# Patient Record
Sex: FEMALE | NOT HISPANIC OR LATINO | URBAN - METROPOLITAN AREA
[De-identification: names, ages, dates, MRNs, and addresses within clinical notes are randomized per-mention and may not be internally consistent; named-entity substitution may affect disease eponyms.]

---

## 2018-10-09 ENCOUNTER — OUTPATIENT (OUTPATIENT)
Dept: OUTPATIENT SERVICES | Facility: HOSPITAL | Age: 38
LOS: 1 days | End: 2018-10-09
Payer: COMMERCIAL

## 2018-10-09 DIAGNOSIS — N92.0 EXCESSIVE AND FREQUENT MENSTRUATION WITH REGULAR CYCLE: ICD-10-CM

## 2018-10-10 ENCOUNTER — RESULT REVIEW (OUTPATIENT)
Age: 38
End: 2018-10-10

## 2018-10-10 ENCOUNTER — APPOINTMENT (OUTPATIENT)
Dept: GYNECOLOGIC ONCOLOGY | Facility: CLINIC | Age: 38
End: 2018-10-10
Payer: MEDICAID

## 2018-10-10 DIAGNOSIS — E28.2 POLYCYSTIC OVARIAN SYNDROME: ICD-10-CM

## 2018-10-10 DIAGNOSIS — Z80.42 FAMILY HISTORY OF MALIGNANT NEOPLASM OF PROSTATE: ICD-10-CM

## 2018-10-10 PROCEDURE — 88321 CONSLTJ&REPRT SLD PREP ELSWR: CPT

## 2018-10-10 PROCEDURE — 99205 OFFICE O/P NEW HI 60 MIN: CPT

## 2018-10-10 RX ORDER — LOSARTAN POTASSIUM 100 MG/1
TABLET, FILM COATED ORAL
Refills: 0 | Status: ACTIVE | COMMUNITY

## 2018-10-10 RX ORDER — GLYBURIDE 2.5 MG/1
TABLET ORAL
Refills: 0 | Status: ACTIVE | COMMUNITY

## 2018-10-11 LAB — SURGICAL PATHOLOGY STUDY: SIGNIFICANT CHANGE UP

## 2018-10-16 ENCOUNTER — FORM ENCOUNTER (OUTPATIENT)
Age: 38
End: 2018-10-16

## 2018-10-17 ENCOUNTER — OUTPATIENT (OUTPATIENT)
Dept: OUTPATIENT SERVICES | Facility: HOSPITAL | Age: 38
LOS: 1 days | End: 2018-10-17
Payer: MEDICAID

## 2018-10-17 ENCOUNTER — APPOINTMENT (OUTPATIENT)
Dept: ULTRASOUND IMAGING | Facility: CLINIC | Age: 38
End: 2018-10-17
Payer: MEDICAID

## 2018-10-17 ENCOUNTER — APPOINTMENT (OUTPATIENT)
Dept: CT IMAGING | Facility: CLINIC | Age: 38
End: 2018-10-17
Payer: MEDICAID

## 2018-10-17 DIAGNOSIS — C54.1 MALIGNANT NEOPLASM OF ENDOMETRIUM: ICD-10-CM

## 2018-10-17 DIAGNOSIS — D25.9 LEIOMYOMA OF UTERUS, UNSPECIFIED: ICD-10-CM

## 2018-10-17 PROCEDURE — 82565 ASSAY OF CREATININE: CPT

## 2018-10-17 PROCEDURE — 76830 TRANSVAGINAL US NON-OB: CPT

## 2018-10-17 PROCEDURE — 74177 CT ABD & PELVIS W/CONTRAST: CPT | Mod: 26

## 2018-10-17 PROCEDURE — 74177 CT ABD & PELVIS W/CONTRAST: CPT

## 2018-10-17 PROCEDURE — 76830 TRANSVAGINAL US NON-OB: CPT | Mod: 26

## 2018-10-30 ENCOUNTER — OTHER (OUTPATIENT)
Age: 38
End: 2018-10-30

## 2018-12-05 ENCOUNTER — APPOINTMENT (OUTPATIENT)
Dept: SURGICAL ONCOLOGY | Facility: CLINIC | Age: 38
End: 2018-12-05
Payer: MEDICAID

## 2018-12-05 VITALS
HEIGHT: 69 IN | BODY MASS INDEX: 43.4 KG/M2 | RESPIRATION RATE: 15 BRPM | WEIGHT: 293 LBS | OXYGEN SATURATION: 98 % | DIASTOLIC BLOOD PRESSURE: 72 MMHG | HEART RATE: 90 BPM | SYSTOLIC BLOOD PRESSURE: 105 MMHG

## 2018-12-05 DIAGNOSIS — K76.0 FATTY (CHANGE OF) LIVER, NOT ELSEWHERE CLASSIFIED: ICD-10-CM

## 2018-12-05 PROCEDURE — 99205 OFFICE O/P NEW HI 60 MIN: CPT

## 2018-12-26 LAB
CORTIS 24H UR-MCNC: 24 H
CORTIS 24H UR-MRATE: 7 MCG/24 H
METAINT: 24 H
METANEPH 24H UR-MRATE: 74 MCG/24 H
METANEPH UR-MCNC: 800 ML
METANEPHS UR-MCNC: 661 MCG/24 H
NORMETANEPHRINE 24H UR-MCNC: 587 MCG/24 H
SPECIMEN VOL 24H UR: 800 ML
VMA/CREAT UR: 3.7 MG/G CR

## 2018-12-28 LAB
17-KETOGENIC STEROIDS [MASS/VOLUME] IN 24 HOUR URINE: 4.2 MG/24 H
Lab: 1.11 G/24 H
SPECIMEN VOL 24H UR: 800 ML

## 2019-01-30 LAB
DOPAMINE UR-MCNC: 313 UG/L
DOPAMINE, UR, 24HR: 250 UG/24 HR
EPINEPH UR-MCNC: 3 UG/L
EPINEPHRINE, U, 24HR: 2 UG/24 HR
NOREPINEPH UR-MCNC: 71 UG/L
NOREPINEPHRINE,U,24H: 57 UG/24 HR

## 2019-02-07 ENCOUNTER — OTHER (OUTPATIENT)
Age: 39
End: 2019-02-07

## 2019-02-13 ENCOUNTER — OTHER (OUTPATIENT)
Age: 39
End: 2019-02-13

## 2019-02-14 ENCOUNTER — OUTPATIENT (OUTPATIENT)
Dept: OUTPATIENT SERVICES | Facility: HOSPITAL | Age: 39
LOS: 1 days | End: 2019-02-14
Payer: MEDICAID

## 2019-02-14 VITALS
RESPIRATION RATE: 14 BRPM | OXYGEN SATURATION: 99 % | WEIGHT: 293 LBS | DIASTOLIC BLOOD PRESSURE: 84 MMHG | TEMPERATURE: 97 F | HEART RATE: 88 BPM | HEIGHT: 67.25 IN | SYSTOLIC BLOOD PRESSURE: 122 MMHG

## 2019-02-14 DIAGNOSIS — Z98.890 OTHER SPECIFIED POSTPROCEDURAL STATES: Chronic | ICD-10-CM

## 2019-02-14 DIAGNOSIS — C54.1 MALIGNANT NEOPLASM OF ENDOMETRIUM: ICD-10-CM

## 2019-02-14 DIAGNOSIS — E11.9 TYPE 2 DIABETES MELLITUS WITHOUT COMPLICATIONS: ICD-10-CM

## 2019-02-14 LAB
ANION GAP SERPL CALC-SCNC: 9 MMO/L — SIGNIFICANT CHANGE UP (ref 7–14)
BLD GP AB SCN SERPL QL: NEGATIVE — SIGNIFICANT CHANGE UP
BUN SERPL-MCNC: 20 MG/DL — SIGNIFICANT CHANGE UP (ref 7–23)
CALCIUM SERPL-MCNC: 8.8 MG/DL — SIGNIFICANT CHANGE UP (ref 8.4–10.5)
CHLORIDE SERPL-SCNC: 100 MMOL/L — SIGNIFICANT CHANGE UP (ref 98–107)
CO2 SERPL-SCNC: 25 MMOL/L — SIGNIFICANT CHANGE UP (ref 22–31)
CREAT SERPL-MCNC: 0.78 MG/DL — SIGNIFICANT CHANGE UP (ref 0.5–1.3)
GLUCOSE SERPL-MCNC: 255 MG/DL — HIGH (ref 70–99)
HBA1C BLD-MCNC: 7.6 % — HIGH (ref 4–5.6)
HCG SERPL-ACNC: < 5 MIU/ML — SIGNIFICANT CHANGE UP
HCT VFR BLD CALC: 34.9 % — SIGNIFICANT CHANGE UP (ref 34.5–45)
HGB BLD-MCNC: 10 G/DL — LOW (ref 11.5–15.5)
MCHC RBC-ENTMCNC: 20.9 PG — LOW (ref 27–34)
MCHC RBC-ENTMCNC: 28.7 % — LOW (ref 32–36)
MCV RBC AUTO: 73 FL — LOW (ref 80–100)
NRBC # FLD: 0 K/UL — LOW (ref 25–125)
PLATELET # BLD AUTO: 342 K/UL — SIGNIFICANT CHANGE UP (ref 150–400)
PMV BLD: 10.4 FL — SIGNIFICANT CHANGE UP (ref 7–13)
POTASSIUM SERPL-MCNC: 4.3 MMOL/L — SIGNIFICANT CHANGE UP (ref 3.5–5.3)
POTASSIUM SERPL-SCNC: 4.3 MMOL/L — SIGNIFICANT CHANGE UP (ref 3.5–5.3)
RBC # BLD: 4.78 M/UL — SIGNIFICANT CHANGE UP (ref 3.8–5.2)
RBC # FLD: 17.4 % — HIGH (ref 10.3–14.5)
RH IG SCN BLD-IMP: POSITIVE — SIGNIFICANT CHANGE UP
SODIUM SERPL-SCNC: 134 MMOL/L — LOW (ref 135–145)
WBC # BLD: 12.35 K/UL — HIGH (ref 3.8–10.5)
WBC # FLD AUTO: 12.35 K/UL — HIGH (ref 3.8–10.5)

## 2019-02-14 PROCEDURE — 93010 ELECTROCARDIOGRAM REPORT: CPT

## 2019-02-14 RX ORDER — SODIUM CHLORIDE 9 MG/ML
3 INJECTION INTRAMUSCULAR; INTRAVENOUS; SUBCUTANEOUS EVERY 8 HOURS
Qty: 0 | Refills: 0 | Status: DISCONTINUED | OUTPATIENT
Start: 2019-02-28 | End: 2019-03-03

## 2019-02-14 NOTE — H&P PST ADULT - NEGATIVE CARDIOVASCULAR SYMPTOMS
no claudication/no orthopnea/no paroxysmal nocturnal dyspnea/no peripheral edema/no dyspnea on exertion/no palpitations/no chest pain

## 2019-02-14 NOTE — H&P PST ADULT - NEGATIVE GENERAL GENITOURINARY SYMPTOMS
no hematuria/normal urinary frequency/no flank pain L/no flank pain R/no bladder infections/no dysuria

## 2019-02-14 NOTE — H&P PST ADULT - RS GEN PE MLT RESP DETAILS PC
no rhonchi/respirations non-labored/clear to auscultation bilaterally/no intercostal retractions/no wheezes/no chest wall tenderness/no rales/breath sounds equal/good air movement

## 2019-02-14 NOTE — H&P PST ADULT - GASTROINTESTINAL DETAILS
no rebound tenderness/soft/nontender/no masses palpable/bowel sounds normal/no bruit/no rigidity/no organomegaly/obese/no guarding

## 2019-02-14 NOTE — H&P PST ADULT - PMH
Diabetes mellitus  type 2  Hyperlipidemia    Hypertension    Malignant neoplasm of endometrium of corpus uteri    Obese

## 2019-02-14 NOTE — H&P PST ADULT - NSANTHOSAYNRD_GEN_A_CORE
No. BONIFACIO screening performed.  STOP BANG Legend: 0-2 = LOW Risk; 3-4 = INTERMEDIATE Risk; 5-8 = HIGH Risk

## 2019-02-14 NOTE — H&P PST ADULT - PROBLEM SELECTOR PLAN 1
Pt scheduled for exploratory laparotomy, IFRAH, possible BSO, possible sentinel lymph node sampling on 2/28/2019,  labs done results pending, ekg done.  Hibiclens and urine cup provided.  Preop teaching done, pt able to verbalize understanding

## 2019-02-14 NOTE — H&P PST ADULT - NEGATIVE GENERAL SYMPTOMS
no weight loss/no chills/no sweating/no anorexia/no weight gain/no polyphagia/no polyuria/no polydipsia/no fever/no malaise/no fatigue

## 2019-02-14 NOTE — H&P PST ADULT - HISTORY OF PRESENT ILLNESS
39y/o female scheduled for exploratory laparotomy total abdominal hysterectomy, possible bilateral salpingo oophorectomy, possible sentinel lymph node sampling on 2/28/2019.  Pt states, "on 9/2018 informed gyn for 10 months straight, stopped occasional for 1 week.  Underwent endometrial bx positive for malignancy.  11/2018 menstruation changed to once a month."

## 2019-02-14 NOTE — H&P PST ADULT - ATTENDING COMMENTS
Pt seen in [facility] with her mother and sister. Planned procedure discussed, including possible conservation vs removal of the ovaries depending upon the intraop findings. Possible LNS again depending upon the intraop findings. All Q/A. Consent reviewed.

## 2019-02-14 NOTE — H&P PST ADULT - MARITAL STATUS
/no children, mother dm, htn, father  bladder cancer dm, hd, 2 siblings 1 brother dm, sister a&w no children, mother dm, htn, father  bladder cancer dm, hd, 2 siblings 1 brother dm, sister a&w, 1 sister  16y/o pancreatic cancer/

## 2019-02-15 ENCOUNTER — OTHER (OUTPATIENT)
Age: 39
End: 2019-02-15

## 2019-02-19 ENCOUNTER — APPOINTMENT (OUTPATIENT)
Dept: GYNECOLOGIC ONCOLOGY | Facility: CLINIC | Age: 39
End: 2019-02-19
Payer: MEDICAID

## 2019-02-19 VITALS
BODY MASS INDEX: 43.4 KG/M2 | SYSTOLIC BLOOD PRESSURE: 132 MMHG | WEIGHT: 293 LBS | HEIGHT: 69 IN | DIASTOLIC BLOOD PRESSURE: 74 MMHG

## 2019-02-19 DIAGNOSIS — E11.9 TYPE 2 DIABETES MELLITUS W/OUT COMPLICATIONS: ICD-10-CM

## 2019-02-19 PROCEDURE — 99214 OFFICE O/P EST MOD 30 MIN: CPT

## 2019-02-27 ENCOUNTER — TRANSCRIPTION ENCOUNTER (OUTPATIENT)
Age: 39
End: 2019-02-27

## 2019-02-27 NOTE — [facility] PATIENT PROFILE, ADULT - DOES PATIENT HAVE ADVANCE DIRECTIVE
Quality 111:Pneumonia Vaccination Status For Older Adults: Pneumococcal Vaccination Previously Received Detail Level: Detailed Quality 110: Preventive Care And Screening: Influenza Immunization: Influenza Immunization Administered during Influenza season No Yes

## 2019-02-28 ENCOUNTER — RESULT REVIEW (OUTPATIENT)
Age: 39
End: 2019-02-28

## 2019-02-28 ENCOUNTER — APPOINTMENT (OUTPATIENT)
Dept: GYNECOLOGIC ONCOLOGY | Facility: HOSPITAL | Age: 39
End: 2019-02-28

## 2019-02-28 ENCOUNTER — INPATIENT (INPATIENT)
Facility: HOSPITAL | Age: 39
LOS: 2 days | Discharge: HOME | End: 2019-03-03
Attending: OBSTETRICS & GYNECOLOGY | Admitting: OBSTETRICS & GYNECOLOGY
Payer: MEDICAID

## 2019-02-28 VITALS
RESPIRATION RATE: 16 BRPM | WEIGHT: 293 LBS | DIASTOLIC BLOOD PRESSURE: 85 MMHG | SYSTOLIC BLOOD PRESSURE: 128 MMHG | OXYGEN SATURATION: 100 % | TEMPERATURE: 98 F | HEIGHT: 67.25 IN | HEART RATE: 91 BPM

## 2019-02-28 DIAGNOSIS — C54.1 MALIGNANT NEOPLASM OF ENDOMETRIUM: ICD-10-CM

## 2019-02-28 DIAGNOSIS — Z98.890 OTHER SPECIFIED POSTPROCEDURAL STATES: Chronic | ICD-10-CM

## 2019-02-28 LAB
ANION GAP SERPL CALC-SCNC: 13 MMO/L — SIGNIFICANT CHANGE UP (ref 7–14)
BUN SERPL-MCNC: 16 MG/DL — SIGNIFICANT CHANGE UP (ref 7–23)
CALCIUM SERPL-MCNC: 8.4 MG/DL — SIGNIFICANT CHANGE UP (ref 8.4–10.5)
CHLORIDE SERPL-SCNC: 101 MMOL/L — SIGNIFICANT CHANGE UP (ref 98–107)
CO2 SERPL-SCNC: 23 MMOL/L — SIGNIFICANT CHANGE UP (ref 22–31)
CREAT SERPL-MCNC: 0.9 MG/DL — SIGNIFICANT CHANGE UP (ref 0.5–1.3)
GLUCOSE BLDC GLUCOMTR-MCNC: 154 MG/DL — HIGH (ref 70–99)
GLUCOSE BLDC GLUCOMTR-MCNC: 241 MG/DL — HIGH (ref 70–99)
GLUCOSE SERPL-MCNC: 225 MG/DL — HIGH (ref 70–99)
HCG UR QL: NEGATIVE — SIGNIFICANT CHANGE UP
HCT VFR BLD CALC: 36.7 % — SIGNIFICANT CHANGE UP (ref 34.5–45)
HGB BLD-MCNC: 10.7 G/DL — LOW (ref 11.5–15.5)
MAGNESIUM SERPL-MCNC: 1.5 MG/DL — LOW (ref 1.6–2.6)
MCHC RBC-ENTMCNC: 21.3 PG — LOW (ref 27–34)
MCHC RBC-ENTMCNC: 29.2 % — LOW (ref 32–36)
MCV RBC AUTO: 73 FL — LOW (ref 80–100)
NRBC # FLD: 0 K/UL — LOW (ref 25–125)
PHOSPHATE SERPL-MCNC: 4.5 MG/DL — SIGNIFICANT CHANGE UP (ref 2.5–4.5)
PLATELET # BLD AUTO: 293 K/UL — SIGNIFICANT CHANGE UP (ref 150–400)
PMV BLD: 11.2 FL — SIGNIFICANT CHANGE UP (ref 7–13)
POTASSIUM SERPL-MCNC: 4.4 MMOL/L — SIGNIFICANT CHANGE UP (ref 3.5–5.3)
POTASSIUM SERPL-SCNC: 4.4 MMOL/L — SIGNIFICANT CHANGE UP (ref 3.5–5.3)
RBC # BLD: 5.03 M/UL — SIGNIFICANT CHANGE UP (ref 3.8–5.2)
RBC # FLD: 16.6 % — HIGH (ref 10.3–14.5)
RH IG SCN BLD-IMP: POSITIVE — SIGNIFICANT CHANGE UP
SODIUM SERPL-SCNC: 137 MMOL/L — SIGNIFICANT CHANGE UP (ref 135–145)
WBC # BLD: 22.06 K/UL — HIGH (ref 3.8–10.5)
WBC # FLD AUTO: 22.06 K/UL — HIGH (ref 3.8–10.5)

## 2019-02-28 PROCEDURE — 88307 TISSUE EXAM BY PATHOLOGIST: CPT | Mod: 26

## 2019-02-28 PROCEDURE — 88305 TISSUE EXAM BY PATHOLOGIST: CPT | Mod: 26

## 2019-02-28 PROCEDURE — 88112 CYTOPATH CELL ENHANCE TECH: CPT | Mod: 26

## 2019-02-28 PROCEDURE — 88302 TISSUE EXAM BY PATHOLOGIST: CPT | Mod: 26

## 2019-02-28 PROCEDURE — 88331 PATH CONSLTJ SURG 1 BLK 1SPC: CPT | Mod: 26

## 2019-02-28 PROCEDURE — 58150 TOTAL HYSTERECTOMY: CPT | Mod: 22

## 2019-02-28 PROCEDURE — 88360 TUMOR IMMUNOHISTOCHEM/MANUAL: CPT | Mod: 26

## 2019-02-28 PROCEDURE — 88341 IMHCHEM/IMCYTCHM EA ADD ANTB: CPT | Mod: 26,59

## 2019-02-28 PROCEDURE — 88342 IMHCHEM/IMCYTCHM 1ST ANTB: CPT | Mod: 26,59

## 2019-02-28 PROCEDURE — 88305 TISSUE EXAM BY PATHOLOGIST: CPT | Mod: 26,59

## 2019-02-28 RX ORDER — GLYBURIDE 5 MG
1 TABLET ORAL
Qty: 0 | Refills: 0 | COMMUNITY

## 2019-02-28 RX ORDER — DEXTROSE 50 % IN WATER 50 %
12.5 SYRINGE (ML) INTRAVENOUS ONCE
Qty: 0 | Refills: 0 | Status: DISCONTINUED | OUTPATIENT
Start: 2019-02-28 | End: 2019-03-03

## 2019-02-28 RX ORDER — ONDANSETRON 8 MG/1
4 TABLET, FILM COATED ORAL EVERY 6 HOURS
Qty: 0 | Refills: 0 | Status: DISCONTINUED | OUTPATIENT
Start: 2019-02-28 | End: 2019-03-03

## 2019-02-28 RX ORDER — NALOXONE HYDROCHLORIDE 4 MG/.1ML
0.1 SPRAY NASAL
Qty: 0 | Refills: 0 | Status: DISCONTINUED | OUTPATIENT
Start: 2019-02-28 | End: 2019-03-01

## 2019-02-28 RX ORDER — GLUCAGON INJECTION, SOLUTION 0.5 MG/.1ML
1 INJECTION, SOLUTION SUBCUTANEOUS ONCE
Qty: 0 | Refills: 0 | Status: DISCONTINUED | OUTPATIENT
Start: 2019-02-28 | End: 2019-03-03

## 2019-02-28 RX ORDER — HYDROMORPHONE HYDROCHLORIDE 2 MG/ML
0.5 INJECTION INTRAMUSCULAR; INTRAVENOUS; SUBCUTANEOUS
Qty: 0 | Refills: 0 | Status: DISCONTINUED | OUTPATIENT
Start: 2019-02-28 | End: 2019-03-01

## 2019-02-28 RX ORDER — ENOXAPARIN SODIUM 100 MG/ML
70 INJECTION SUBCUTANEOUS DAILY
Qty: 0 | Refills: 0 | Status: COMPLETED | OUTPATIENT
Start: 2020-02-26 | End: 2021-01-24

## 2019-02-28 RX ORDER — METFORMIN HYDROCHLORIDE 850 MG/1
1 TABLET ORAL
Qty: 0 | Refills: 0 | COMMUNITY

## 2019-02-28 RX ORDER — DIPHENHYDRAMINE HCL 50 MG
25 CAPSULE ORAL EVERY 4 HOURS
Qty: 0 | Refills: 0 | Status: DISCONTINUED | OUTPATIENT
Start: 2019-02-28 | End: 2019-03-01

## 2019-02-28 RX ORDER — SODIUM CHLORIDE 9 MG/ML
1000 INJECTION, SOLUTION INTRAVENOUS
Qty: 0 | Refills: 0 | Status: DISCONTINUED | OUTPATIENT
Start: 2019-02-28 | End: 2019-02-28

## 2019-02-28 RX ORDER — ASPIRIN/CALCIUM CARB/MAGNESIUM 324 MG
1 TABLET ORAL
Qty: 0 | Refills: 0 | COMMUNITY

## 2019-02-28 RX ORDER — DEXTROSE 50 % IN WATER 50 %
25 SYRINGE (ML) INTRAVENOUS ONCE
Qty: 0 | Refills: 0 | Status: DISCONTINUED | OUTPATIENT
Start: 2019-02-28 | End: 2019-03-03

## 2019-02-28 RX ORDER — INSULIN LISPRO 100/ML
VIAL (ML) SUBCUTANEOUS
Qty: 0 | Refills: 0 | Status: DISCONTINUED | OUTPATIENT
Start: 2019-02-28 | End: 2019-03-03

## 2019-02-28 RX ORDER — HEPARIN SODIUM 5000 [USP'U]/ML
5000 INJECTION INTRAVENOUS; SUBCUTANEOUS ONCE
Qty: 0 | Refills: 0 | Status: COMPLETED | OUTPATIENT
Start: 2019-02-28 | End: 2019-02-28

## 2019-02-28 RX ORDER — SIMETHICONE 80 MG/1
80 TABLET, CHEWABLE ORAL EVERY 6 HOURS
Qty: 0 | Refills: 0 | Status: DISCONTINUED | OUTPATIENT
Start: 2019-02-28 | End: 2019-03-03

## 2019-02-28 RX ORDER — DEXTROSE 50 % IN WATER 50 %
15 SYRINGE (ML) INTRAVENOUS ONCE
Qty: 0 | Refills: 0 | Status: DISCONTINUED | OUTPATIENT
Start: 2019-02-28 | End: 2019-03-03

## 2019-02-28 RX ORDER — DOCUSATE SODIUM 100 MG
100 CAPSULE ORAL
Qty: 0 | Refills: 0 | Status: DISCONTINUED | OUTPATIENT
Start: 2019-02-28 | End: 2019-03-03

## 2019-02-28 RX ORDER — ONDANSETRON 8 MG/1
4 TABLET, FILM COATED ORAL ONCE
Qty: 0 | Refills: 0 | Status: DISCONTINUED | OUTPATIENT
Start: 2019-02-28 | End: 2019-03-03

## 2019-02-28 RX ORDER — SODIUM CHLORIDE 9 MG/ML
1000 INJECTION, SOLUTION INTRAVENOUS
Qty: 0 | Refills: 0 | Status: DISCONTINUED | OUTPATIENT
Start: 2019-02-28 | End: 2019-03-03

## 2019-02-28 RX ORDER — SODIUM CHLORIDE 9 MG/ML
1000 INJECTION, SOLUTION INTRAVENOUS
Qty: 0 | Refills: 0 | Status: DISCONTINUED | OUTPATIENT
Start: 2019-02-28 | End: 2019-03-01

## 2019-02-28 RX ORDER — LOSARTAN POTASSIUM 100 MG/1
1 TABLET, FILM COATED ORAL
Qty: 0 | Refills: 0 | COMMUNITY

## 2019-02-28 RX ORDER — HYDROMORPHONE HYDROCHLORIDE 2 MG/ML
30 INJECTION INTRAMUSCULAR; INTRAVENOUS; SUBCUTANEOUS
Qty: 0 | Refills: 0 | Status: DISCONTINUED | OUTPATIENT
Start: 2019-02-28 | End: 2019-03-01

## 2019-02-28 RX ORDER — SIMVASTATIN 20 MG/1
1 TABLET, FILM COATED ORAL
Qty: 0 | Refills: 0 | COMMUNITY

## 2019-02-28 RX ADMIN — Medication 2: at 23:19

## 2019-02-28 RX ADMIN — HYDROMORPHONE HYDROCHLORIDE 0.5 MILLIGRAM(S): 2 INJECTION INTRAMUSCULAR; INTRAVENOUS; SUBCUTANEOUS at 21:05

## 2019-02-28 RX ADMIN — SODIUM CHLORIDE 185 MILLILITER(S): 9 INJECTION, SOLUTION INTRAVENOUS at 20:08

## 2019-02-28 RX ADMIN — SODIUM CHLORIDE 3 MILLILITER(S): 9 INJECTION INTRAMUSCULAR; INTRAVENOUS; SUBCUTANEOUS at 21:51

## 2019-02-28 RX ADMIN — HYDROMORPHONE HYDROCHLORIDE 0.5 MILLIGRAM(S): 2 INJECTION INTRAMUSCULAR; INTRAVENOUS; SUBCUTANEOUS at 21:45

## 2019-02-28 RX ADMIN — HYDROMORPHONE HYDROCHLORIDE 30 MILLILITER(S): 2 INJECTION INTRAMUSCULAR; INTRAVENOUS; SUBCUTANEOUS at 20:04

## 2019-02-28 RX ADMIN — HYDROMORPHONE HYDROCHLORIDE 0.5 MILLIGRAM(S): 2 INJECTION INTRAMUSCULAR; INTRAVENOUS; SUBCUTANEOUS at 21:30

## 2019-02-28 RX ADMIN — HEPARIN SODIUM 5000 UNIT(S): 5000 INJECTION INTRAVENOUS; SUBCUTANEOUS at 22:31

## 2019-02-28 RX ADMIN — HYDROMORPHONE HYDROCHLORIDE 0.5 MILLIGRAM(S): 2 INJECTION INTRAMUSCULAR; INTRAVENOUS; SUBCUTANEOUS at 20:50

## 2019-02-28 RX ADMIN — HYDROMORPHONE HYDROCHLORIDE 30 MILLILITER(S): 2 INJECTION INTRAMUSCULAR; INTRAVENOUS; SUBCUTANEOUS at 21:38

## 2019-03-01 ENCOUNTER — OUTPATIENT (OUTPATIENT)
Dept: OUTPATIENT SERVICES | Facility: HOSPITAL | Age: 39
LOS: 1 days | End: 2019-03-01
Payer: MEDICAID

## 2019-03-01 DIAGNOSIS — Z98.890 OTHER SPECIFIED POSTPROCEDURAL STATES: Chronic | ICD-10-CM

## 2019-03-01 DIAGNOSIS — Z98.890 OTHER SPECIFIED POSTPROCEDURAL STATES: ICD-10-CM

## 2019-03-01 LAB
ANION GAP SERPL CALC-SCNC: 11 MMO/L — SIGNIFICANT CHANGE UP (ref 7–14)
BUN SERPL-MCNC: 16 MG/DL — SIGNIFICANT CHANGE UP (ref 7–23)
CALCIUM SERPL-MCNC: 8.5 MG/DL — SIGNIFICANT CHANGE UP (ref 8.4–10.5)
CHLORIDE SERPL-SCNC: 100 MMOL/L — SIGNIFICANT CHANGE UP (ref 98–107)
CO2 SERPL-SCNC: 23 MMOL/L — SIGNIFICANT CHANGE UP (ref 22–31)
CREAT SERPL-MCNC: 0.92 MG/DL — SIGNIFICANT CHANGE UP (ref 0.5–1.3)
GLUCOSE BLDC GLUCOMTR-MCNC: 156 MG/DL — HIGH (ref 70–99)
GLUCOSE BLDC GLUCOMTR-MCNC: 164 MG/DL — HIGH (ref 70–99)
GLUCOSE BLDC GLUCOMTR-MCNC: 187 MG/DL — HIGH (ref 70–99)
GLUCOSE BLDC GLUCOMTR-MCNC: 212 MG/DL — HIGH (ref 70–99)
GLUCOSE SERPL-MCNC: 199 MG/DL — HIGH (ref 70–99)
HCT VFR BLD CALC: 35.3 % — SIGNIFICANT CHANGE UP (ref 34.5–45)
HGB BLD-MCNC: 9.9 G/DL — LOW (ref 11.5–15.5)
MAGNESIUM SERPL-MCNC: 1.6 MG/DL — SIGNIFICANT CHANGE UP (ref 1.6–2.6)
MCHC RBC-ENTMCNC: 21.1 PG — LOW (ref 27–34)
MCHC RBC-ENTMCNC: 28 % — LOW (ref 32–36)
MCV RBC AUTO: 75.1 FL — LOW (ref 80–100)
NRBC # FLD: 0 K/UL — LOW (ref 25–125)
PHOSPHATE SERPL-MCNC: 4.3 MG/DL — SIGNIFICANT CHANGE UP (ref 2.5–4.5)
PLATELET # BLD AUTO: 311 K/UL — SIGNIFICANT CHANGE UP (ref 150–400)
PMV BLD: 11 FL — SIGNIFICANT CHANGE UP (ref 7–13)
POTASSIUM SERPL-MCNC: 5.1 MMOL/L — SIGNIFICANT CHANGE UP (ref 3.5–5.3)
POTASSIUM SERPL-SCNC: 5.1 MMOL/L — SIGNIFICANT CHANGE UP (ref 3.5–5.3)
RBC # BLD: 4.7 M/UL — SIGNIFICANT CHANGE UP (ref 3.8–5.2)
RBC # FLD: 15.9 % — HIGH (ref 10.3–14.5)
SODIUM SERPL-SCNC: 134 MMOL/L — LOW (ref 135–145)
WBC # BLD: 17.51 K/UL — HIGH (ref 3.8–10.5)
WBC # FLD AUTO: 17.51 K/UL — HIGH (ref 3.8–10.5)

## 2019-03-01 PROCEDURE — G9001: CPT

## 2019-03-01 RX ORDER — ENOXAPARIN SODIUM 100 MG/ML
40 INJECTION SUBCUTANEOUS DAILY
Qty: 0 | Refills: 0 | Status: DISCONTINUED | OUTPATIENT
Start: 2019-03-01 | End: 2019-03-03

## 2019-03-01 RX ORDER — OXYCODONE HYDROCHLORIDE 5 MG/1
10 TABLET ORAL
Qty: 0 | Refills: 0 | Status: DISCONTINUED | OUTPATIENT
Start: 2019-03-01 | End: 2019-03-03

## 2019-03-01 RX ORDER — ACETAMINOPHEN 500 MG
975 TABLET ORAL EVERY 6 HOURS
Qty: 0 | Refills: 0 | Status: DISCONTINUED | OUTPATIENT
Start: 2019-03-01 | End: 2019-03-03

## 2019-03-01 RX ORDER — ACETAMINOPHEN 500 MG
1000 TABLET ORAL ONCE
Qty: 0 | Refills: 0 | Status: DISCONTINUED | OUTPATIENT
Start: 2019-03-01 | End: 2019-03-01

## 2019-03-01 RX ORDER — METOPROLOL TARTRATE 50 MG
5 TABLET ORAL EVERY 6 HOURS
Qty: 0 | Refills: 0 | Status: DISCONTINUED | OUTPATIENT
Start: 2019-03-01 | End: 2019-03-03

## 2019-03-01 RX ORDER — OXYCODONE HYDROCHLORIDE 5 MG/1
5 TABLET ORAL
Qty: 0 | Refills: 0 | Status: DISCONTINUED | OUTPATIENT
Start: 2019-03-01 | End: 2019-03-03

## 2019-03-01 RX ORDER — IBUPROFEN 200 MG
600 TABLET ORAL EVERY 6 HOURS
Qty: 0 | Refills: 0 | Status: DISCONTINUED | OUTPATIENT
Start: 2019-03-01 | End: 2019-03-03

## 2019-03-01 RX ADMIN — OXYCODONE HYDROCHLORIDE 10 MILLIGRAM(S): 5 TABLET ORAL at 22:48

## 2019-03-01 RX ADMIN — SODIUM CHLORIDE 3 MILLILITER(S): 9 INJECTION INTRAMUSCULAR; INTRAVENOUS; SUBCUTANEOUS at 05:46

## 2019-03-01 RX ADMIN — Medication 100 MILLIGRAM(S): at 17:32

## 2019-03-01 RX ADMIN — Medication 100 MILLIGRAM(S): at 05:45

## 2019-03-01 RX ADMIN — Medication 600 MILLIGRAM(S): at 17:30

## 2019-03-01 RX ADMIN — Medication 1: at 08:50

## 2019-03-01 RX ADMIN — SIMETHICONE 80 MILLIGRAM(S): 80 TABLET, CHEWABLE ORAL at 01:01

## 2019-03-01 RX ADMIN — HYDROMORPHONE HYDROCHLORIDE 30 MILLILITER(S): 2 INJECTION INTRAMUSCULAR; INTRAVENOUS; SUBCUTANEOUS at 00:34

## 2019-03-01 RX ADMIN — SIMETHICONE 80 MILLIGRAM(S): 80 TABLET, CHEWABLE ORAL at 17:31

## 2019-03-01 RX ADMIN — Medication 1: at 12:05

## 2019-03-01 RX ADMIN — Medication 975 MILLIGRAM(S): at 17:30

## 2019-03-01 RX ADMIN — SODIUM CHLORIDE 3 MILLILITER(S): 9 INJECTION INTRAMUSCULAR; INTRAVENOUS; SUBCUTANEOUS at 13:50

## 2019-03-01 RX ADMIN — OXYCODONE HYDROCHLORIDE 10 MILLIGRAM(S): 5 TABLET ORAL at 23:12

## 2019-03-01 RX ADMIN — Medication 1: at 17:31

## 2019-03-01 RX ADMIN — SODIUM CHLORIDE 3 MILLILITER(S): 9 INJECTION INTRAMUSCULAR; INTRAVENOUS; SUBCUTANEOUS at 21:22

## 2019-03-01 RX ADMIN — SIMETHICONE 80 MILLIGRAM(S): 80 TABLET, CHEWABLE ORAL at 05:45

## 2019-03-01 RX ADMIN — ENOXAPARIN SODIUM 40 MILLIGRAM(S): 100 INJECTION SUBCUTANEOUS at 12:04

## 2019-03-01 RX ADMIN — HYDROMORPHONE HYDROCHLORIDE 30 MILLILITER(S): 2 INJECTION INTRAMUSCULAR; INTRAVENOUS; SUBCUTANEOUS at 07:19

## 2019-03-01 RX ADMIN — SIMETHICONE 80 MILLIGRAM(S): 80 TABLET, CHEWABLE ORAL at 12:04

## 2019-03-01 NOTE — PROGRESS NOTE ADULT - SUBJECTIVE AND OBJECTIVE BOX
Anesthesia Pain Management Service    SUBJECTIVE: Patient is doing well with IV PCA and no significant problems reported.    Pain Scale Score	At rest: __5_ 	With Activity: ___ 	[X ] Refer to charted pain scores    THERAPY:    [ ] IV PCA Morphine		[ ] 5 mg/mL	[ ] 1 mg/mL  [X ] IV PCA Hydromorphone	[ ] 5 mg/mL	[X ] 1 mg/mL  [ ] IV PCA Fentanyl		[ ] 50 micrograms/mL    Demand dose __0.2_ lockout __6_ (minutes) Continuous Rate _0__ Total: _3.8__  mg used (in past 24 hours)      MEDICATIONS  (STANDING):  acetaminophen  IVPB .. 1000 milliGRAM(s) IV Intermittent once  dextrose 5%. 1000 milliLiter(s) (50 mL/Hr) IV Continuous <Continuous>  dextrose 50% Injectable 12.5 Gram(s) IV Push once  dextrose 50% Injectable 25 Gram(s) IV Push once  dextrose 50% Injectable 25 Gram(s) IV Push once  docusate sodium 100 milliGRAM(s) Oral two times a day  HYDROmorphone PCA (1 mG/mL) 30 milliLiter(s) PCA Continuous PCA Continuous  insulin lispro (HumaLOG) corrective regimen sliding scale   SubCutaneous three times a day before meals  lactated ringers. 1000 milliLiter(s) (185 mL/Hr) IV Continuous <Continuous>  simethicone 80 milliGRAM(s) Chew every 6 hours  sodium chloride 0.9% lock flush 3 milliLiter(s) IV Push every 8 hours    MEDICATIONS  (PRN):  dextrose 40% Gel 15 Gram(s) Oral once PRN Blood Glucose LESS THAN 70 milliGRAM(s)/deciliter  diphenhydrAMINE   Injectable 25 milliGRAM(s) IV Push every 4 hours PRN Pruritus  glucagon  Injectable 1 milliGRAM(s) IntraMuscular once PRN Glucose LESS THAN 70 milligrams/deciliter  HYDROmorphone  Injectable 0.5 milliGRAM(s) IV Push every 10 minutes PRN Severe Pain (7 - 10)  HYDROmorphone PCA (1 mG/mL) Rescue Clinician Bolus 0.5 milliGRAM(s) IV Push every 15 minutes PRN for Pain Scale GREATER THAN 6  naloxone Injectable 0.1 milliGRAM(s) IV Push every 3 minutes PRN For ANY of the following changes in patient status:  A. RR LESS THAN 10 breaths per minute, B. Oxygen saturation LESS THAN 90%, C. Sedation score of 6  ondansetron Injectable 4 milliGRAM(s) IV Push every 6 hours PRN Nausea  ondansetron Injectable 4 milliGRAM(s) IV Push once PRN Nausea and/or Vomiting      OBJECTIVE:    Sedation Score:	[ X] Alert	[ ] Drowsy 	[ ] Arousable	[ ] Asleep	[ ] Unresponsive    Side Effects:	[X ] None	[ ] Nausea	[ ] Vomiting	[ ] Pruritus  		[ ] Other:    Vital Signs Last 24 Hrs  T(C): 36.5 (01 Mar 2019 05:42), Max: 37.1 (28 Feb 2019 19:35)  T(F): 97.7 (01 Mar 2019 05:42), Max: 98.8 (28 Feb 2019 19:35)  HR: 84 (01 Mar 2019 05:42) (79 - 97)  BP: 119/74 (01 Mar 2019 05:42) (108/72 - 143/90)  BP(mean): 72 (28 Feb 2019 23:00) (72 - 100)  RR: 18 (01 Mar 2019 05:42) (13 - 20)  SpO2: 97% (01 Mar 2019 05:42) (84% - 100%)    ASSESSMENT/ PLAN    Therapy to  be:	[ X] Continue   [ ] Discontinued   [ ] Change to prn Analgesics    Documentation and Verification of current medications:   [X] Done	[ ] Not done, not elligible    Comments: Patient cleared for PO intake but hasn't had breakfast yet. Can d/c IV PCA if patient tolerating PO intake. Will add IV tylenol.     Progress Note written now but Patient was seen earlier.

## 2019-03-01 NOTE — PROGRESS NOTE ADULT - ASSESSMENT
39 y/o s/p ex-lap, IFRAH, BSO, MUS, USS, posterior repair (frozen=benign). 39 y/o s/p ex-lap, IFRAH, BSO, MUS, USS, posterior repair (frozen=benign). Doing well this AM.

## 2019-03-01 NOTE — PROGRESS NOTE ADULT - SUBJECTIVE AND OBJECTIVE BOX
Subjective  Patient evaluated at bedside. Overnight patient with oxygen desaturation to 85% on room air, started on )2 by nasal cannula with improvement.  Currently denies chest pain, shortness of breath.  Denies history of BONIFACIO.  Patient out of bed tolerating clears.  Pain well controlled.  No flatus.  Patient denies chest pain, shortness of breath, fevers, chills, nausea, vomiting, diarrhea.        dextrose 40% Gel 15 Gram(s) Oral once PRN  dextrose 5%. 1000 milliLiter(s) IV Continuous <Continuous>  dextrose 50% Injectable 12.5 Gram(s) IV Push once  dextrose 50% Injectable 25 Gram(s) IV Push once  dextrose 50% Injectable 25 Gram(s) IV Push once  diphenhydrAMINE   Injectable 25 milliGRAM(s) IV Push every 4 hours PRN  docusate sodium 100 milliGRAM(s) Oral two times a day  glucagon  Injectable 1 milliGRAM(s) IntraMuscular once PRN  HYDROmorphone  Injectable 0.5 milliGRAM(s) IV Push every 10 minutes PRN  HYDROmorphone PCA (1 mG/mL) 30 milliLiter(s) PCA Continuous PCA Continuous  HYDROmorphone PCA (1 mG/mL) Rescue Clinician Bolus 0.5 milliGRAM(s) IV Push every 15 minutes PRN  insulin lispro (HumaLOG) corrective regimen sliding scale   SubCutaneous three times a day before meals  lactated ringers. 1000 milliLiter(s) IV Continuous <Continuous>  naloxone Injectable 0.1 milliGRAM(s) IV Push every 3 minutes PRN  ondansetron Injectable 4 milliGRAM(s) IV Push every 6 hours PRN  ondansetron Injectable 4 milliGRAM(s) IV Push once PRN  simethicone 80 milliGRAM(s) Chew every 6 hours  sodium chloride 0.9% lock flush 3 milliLiter(s) IV Push every 8 hours      Objective  Physical exam  VS: T(C): 36.5 (03-01-19 @ 05:42), Max: 36.8 (02-28-19 @ 22:45)  HR: 84 (03-01-19 @ 05:42) (79 - 97)  BP: 119/74 (03-01-19 @ 05:42) (108/72 - 143/90)  RR: 18 (03-01-19 @ 05:42) (13 - 20)  SpO2: 97% (03-01-19 @ 05:42) (84% - 98%)  Gen: No acute distress, alert and oriented  CV: Regular rate and rhythm  Pulm: Clear to ascultation bilaterally  Abd:  corpulent.  Midline verticle incision with provena in place.  Nontender nondistended  Ext: nontender bilaterally, SCDs in place      02-28 @ 07:01  -  03-01 @ 07:00  --------------------------------------------------------  IN: 1790 mL / OUT: 620 mL / NET: 1170 mL                A/P  38y F POD#   s/p    Neuro: Pain controlled with  CV: Hemodynamically stable  Pulm: Oxygenating well on room air, continue IS  GI: Tolerating  : Adequate UOP, continue to monitor I's/O's  ID: afebrile, WBC stable  Heme: DVT prophylaxis with   , early ambulation and SCDs      Gretta Pierre PGY4  [id number] Subjective  Patient evaluated at bedside. Overnight patient with oxygen desaturation to 85% on room air, started on )2 by nasal cannula with improvement.  Currently denies chest pain, shortness of breath.  Denies history of BONIFACIO.  Patient out of bed tolerating clears.  Pain well controlled.  No flatus.  Patient denies chest pain, shortness of breath, fevers, chills, nausea, vomiting, diarrhea.        dextrose 40% Gel 15 Gram(s) Oral once PRN  dextrose 5%. 1000 milliLiter(s) IV Continuous <Continuous>  dextrose 50% Injectable 12.5 Gram(s) IV Push once  dextrose 50% Injectable 25 Gram(s) IV Push once  dextrose 50% Injectable 25 Gram(s) IV Push once  diphenhydrAMINE   Injectable 25 milliGRAM(s) IV Push every 4 hours PRN  docusate sodium 100 milliGRAM(s) Oral two times a day  glucagon  Injectable 1 milliGRAM(s) IntraMuscular once PRN  HYDROmorphone  Injectable 0.5 milliGRAM(s) IV Push every 10 minutes PRN  HYDROmorphone PCA (1 mG/mL) 30 milliLiter(s) PCA Continuous PCA Continuous  HYDROmorphone PCA (1 mG/mL) Rescue Clinician Bolus 0.5 milliGRAM(s) IV Push every 15 minutes PRN  insulin lispro (HumaLOG) corrective regimen sliding scale   SubCutaneous three times a day before meals  lactated ringers. 1000 milliLiter(s) IV Continuous <Continuous>  naloxone Injectable 0.1 milliGRAM(s) IV Push every 3 minutes PRN  ondansetron Injectable 4 milliGRAM(s) IV Push every 6 hours PRN  ondansetron Injectable 4 milliGRAM(s) IV Push once PRN  simethicone 80 milliGRAM(s) Chew every 6 hours  sodium chloride 0.9% lock flush 3 milliLiter(s) IV Push every 8 hours      Objective  Physical exam  VS: T(C): 36.5 (03-01-19 @ 05:42), Max: 36.8 (02-28-19 @ 22:45)  HR: 84 (03-01-19 @ 05:42) (79 - 97)  BP: 119/74 (03-01-19 @ 05:42) (108/72 - 143/90)  RR: 18 (03-01-19 @ 05:42) (13 - 20)  SpO2: 97% (03-01-19 @ 05:42) (84% - 98%)  Gen: No acute distress, alert and oriented  CV: Regular rate and rhythm  Pulm: Clear to ascultation bilaterally  Abd:  corpulent.  Midline verticle incision with provena in place.  Nontender nondistended  Ext: nontender bilaterally, SCDs in place      02-28 @ 07:01  -  03-01 @ 07:00  --------------------------------------------------------  IN: 1790 mL / OUT: 620 mL / NET: 1170 mL

## 2019-03-01 NOTE — PROGRESS NOTE ADULT - SUBJECTIVE AND OBJECTIVE BOX
ANESTHESIA POSTOP CHECK    38y Female POSTOP DAY 1 S/P IFRAH/BSO    Vital Signs Last 24 Hrs  T(C): 36.5 (01 Mar 2019 05:42), Max: 37.1 (28 Feb 2019 19:35)  T(F): 97.7 (01 Mar 2019 05:42), Max: 98.8 (28 Feb 2019 19:35)  HR: 84 (01 Mar 2019 05:42) (79 - 97)  BP: 119/74 (01 Mar 2019 05:42) (108/72 - 143/90)  BP(mean): 72 (28 Feb 2019 23:00) (72 - 100)  RR: 18 (01 Mar 2019 05:42) (13 - 20)  SpO2: 97% (01 Mar 2019 05:42) (84% - 100%)  I&O's Summary    28 Feb 2019 07:01  -  01 Mar 2019 07:00  --------------------------------------------------------  IN: 1790 mL / OUT: 620 mL / NET: 1170 mL        [X ] NO APPARENT ANESTHESIA COMPLICATIONS      Comments: none

## 2019-03-01 NOTE — PROGRESS NOTE ADULT - PROBLEM SELECTOR PLAN 1
CV: hemodynamically stable at this time, continue to monitor VS, appropriate H/H drop overnight  Pulm: sat well on RA, encourage IS, on O2 overnight for desat, continue to monitor oxygenation this AM, wean from NC  GI: advance to reg diabetic diet  : Adequate UOP overnight, lobato d/c'd this AM, DTV@330p  Heme: HSQ/venodynes overnight, start Lovenox this AM; encourage ambulation  Endo: ISS, monitor glucose levels  Neuro: PCA for pain control    Farhana, pgy3

## 2019-03-01 NOTE — CHART NOTE - NSCHARTNOTEFT_GEN_A_CORE
R3 Update    Pt seen and evaluated at bedside. Pt ambulating, voiding, passing flatus, tolerating PO. She denies SOB, CP, n/v, fever/chills. No other complaints at this time.    Vital Signs Last 24 Hrs  T(C): 36.8 (01 Mar 2019 16:42), Max: 37.1 (28 Feb 2019 19:35)  T(F): 98.2 (01 Mar 2019 16:42), Max: 98.8 (28 Feb 2019 19:35)  HR: 83 (01 Mar 2019 16:42) (70 - 97)  BP: 108/74 (01 Mar 2019 16:42) (106/48 - 143/90)  BP(mean): 72 (28 Feb 2019 23:00) (72 - 100)  RR: 19 (01 Mar 2019 16:42) (13 - 20)  SpO2: 97% (01 Mar 2019 16:42) (84% - 100%)    Gen: NAD, AAOx3  Abd: soft, NT, ND  Ext: NTB/L    Plan:  -continue routine post-op care  -transitioned to PO pain meds today  -c/w lovenox for DVT ppx  -c/w ISS for glucose control  -transition to losartan this weekend    Farhana, pgy3

## 2019-03-01 NOTE — CHART NOTE - NSCHARTNOTEFT_GEN_A_CORE
Seen in f/u. Labs and flow reviewed. Surgery and findings discussed. Instructions disc, including wound care/ Coverage discussed; case d/w LDS as well. All Q/A. Seen in f/u. Labs and flow reviewed. Surgery and findings discussed. She reports ping PO, voiding, and ambulating. Instructions disc, including wound care/ Coverage discussed; case d/w LDS as well. All Q/A.

## 2019-03-02 LAB
ANION GAP SERPL CALC-SCNC: 12 MMO/L — SIGNIFICANT CHANGE UP (ref 7–14)
BASOPHILS # BLD AUTO: 0.03 K/UL — SIGNIFICANT CHANGE UP (ref 0–0.2)
BASOPHILS NFR BLD AUTO: 0.3 % — SIGNIFICANT CHANGE UP (ref 0–2)
BUN SERPL-MCNC: 16 MG/DL — SIGNIFICANT CHANGE UP (ref 7–23)
CALCIUM SERPL-MCNC: 8.5 MG/DL — SIGNIFICANT CHANGE UP (ref 8.4–10.5)
CHLORIDE SERPL-SCNC: 103 MMOL/L — SIGNIFICANT CHANGE UP (ref 98–107)
CO2 SERPL-SCNC: 23 MMOL/L — SIGNIFICANT CHANGE UP (ref 22–31)
CREAT SERPL-MCNC: 1.05 MG/DL — SIGNIFICANT CHANGE UP (ref 0.5–1.3)
EOSINOPHIL # BLD AUTO: 0.06 K/UL — SIGNIFICANT CHANGE UP (ref 0–0.5)
EOSINOPHIL NFR BLD AUTO: 0.6 % — SIGNIFICANT CHANGE UP (ref 0–6)
GLUCOSE BLDC GLUCOMTR-MCNC: 139 MG/DL — HIGH (ref 70–99)
GLUCOSE BLDC GLUCOMTR-MCNC: 190 MG/DL — HIGH (ref 70–99)
GLUCOSE BLDC GLUCOMTR-MCNC: 195 MG/DL — HIGH (ref 70–99)
GLUCOSE BLDC GLUCOMTR-MCNC: 209 MG/DL — HIGH (ref 70–99)
GLUCOSE SERPL-MCNC: 137 MG/DL — HIGH (ref 70–99)
HCT VFR BLD CALC: 33.8 % — LOW (ref 34.5–45)
HCT VFR BLD CALC: 34 % — LOW (ref 34.5–45)
HGB BLD-MCNC: 8.8 G/DL — LOW (ref 11.5–15.5)
HGB BLD-MCNC: 9.8 G/DL — LOW (ref 11.5–15.5)
IMM GRANULOCYTES NFR BLD AUTO: 0.3 % — SIGNIFICANT CHANGE UP (ref 0–1.5)
LYMPHOCYTES # BLD AUTO: 3.48 K/UL — HIGH (ref 1–3.3)
LYMPHOCYTES # BLD AUTO: 32.4 % — SIGNIFICANT CHANGE UP (ref 13–44)
MAGNESIUM SERPL-MCNC: 1.7 MG/DL — SIGNIFICANT CHANGE UP (ref 1.6–2.6)
MCHC RBC-ENTMCNC: 21.2 PG — LOW (ref 27–34)
MCHC RBC-ENTMCNC: 26 % — LOW (ref 32–36)
MCV RBC AUTO: 81.4 FL — SIGNIFICANT CHANGE UP (ref 80–100)
MONOCYTES # BLD AUTO: 0.68 K/UL — SIGNIFICANT CHANGE UP (ref 0–0.9)
MONOCYTES NFR BLD AUTO: 6.3 % — SIGNIFICANT CHANGE UP (ref 2–14)
NEUTROPHILS # BLD AUTO: 6.45 K/UL — SIGNIFICANT CHANGE UP (ref 1.8–7.4)
NEUTROPHILS NFR BLD AUTO: 60.1 % — SIGNIFICANT CHANGE UP (ref 43–77)
NRBC # FLD: 0 K/UL — LOW (ref 25–125)
PHOSPHATE SERPL-MCNC: 3.3 MG/DL — SIGNIFICANT CHANGE UP (ref 2.5–4.5)
PLATELET # BLD AUTO: 240 K/UL — SIGNIFICANT CHANGE UP (ref 150–400)
PMV BLD: 11.2 FL — SIGNIFICANT CHANGE UP (ref 7–13)
POTASSIUM SERPL-MCNC: 4.3 MMOL/L — SIGNIFICANT CHANGE UP (ref 3.5–5.3)
POTASSIUM SERPL-SCNC: 4.3 MMOL/L — SIGNIFICANT CHANGE UP (ref 3.5–5.3)
RBC # BLD: 4.15 M/UL — SIGNIFICANT CHANGE UP (ref 3.8–5.2)
RBC # FLD: 16.5 % — HIGH (ref 10.3–14.5)
SODIUM SERPL-SCNC: 138 MMOL/L — SIGNIFICANT CHANGE UP (ref 135–145)
WBC # BLD: 10.73 K/UL — HIGH (ref 3.8–10.5)
WBC # FLD AUTO: 10.73 K/UL — HIGH (ref 3.8–10.5)

## 2019-03-02 RX ADMIN — Medication 975 MILLIGRAM(S): at 18:28

## 2019-03-02 RX ADMIN — Medication 600 MILLIGRAM(S): at 12:46

## 2019-03-02 RX ADMIN — Medication 600 MILLIGRAM(S): at 07:00

## 2019-03-02 RX ADMIN — OXYCODONE HYDROCHLORIDE 10 MILLIGRAM(S): 5 TABLET ORAL at 21:43

## 2019-03-02 RX ADMIN — Medication 600 MILLIGRAM(S): at 06:10

## 2019-03-02 RX ADMIN — SIMETHICONE 80 MILLIGRAM(S): 80 TABLET, CHEWABLE ORAL at 12:47

## 2019-03-02 RX ADMIN — ENOXAPARIN SODIUM 40 MILLIGRAM(S): 100 INJECTION SUBCUTANEOUS at 12:46

## 2019-03-02 RX ADMIN — SIMETHICONE 80 MILLIGRAM(S): 80 TABLET, CHEWABLE ORAL at 17:33

## 2019-03-02 RX ADMIN — Medication 975 MILLIGRAM(S): at 06:14

## 2019-03-02 RX ADMIN — Medication 600 MILLIGRAM(S): at 17:33

## 2019-03-02 RX ADMIN — SODIUM CHLORIDE 3 MILLILITER(S): 9 INJECTION INTRAMUSCULAR; INTRAVENOUS; SUBCUTANEOUS at 13:30

## 2019-03-02 RX ADMIN — Medication 600 MILLIGRAM(S): at 23:56

## 2019-03-02 RX ADMIN — Medication 100 MILLIGRAM(S): at 17:33

## 2019-03-02 RX ADMIN — Medication 975 MILLIGRAM(S): at 17:32

## 2019-03-02 RX ADMIN — Medication 975 MILLIGRAM(S): at 13:30

## 2019-03-02 RX ADMIN — Medication 975 MILLIGRAM(S): at 07:00

## 2019-03-02 RX ADMIN — SIMETHICONE 80 MILLIGRAM(S): 80 TABLET, CHEWABLE ORAL at 06:10

## 2019-03-02 RX ADMIN — SODIUM CHLORIDE 3 MILLILITER(S): 9 INJECTION INTRAMUSCULAR; INTRAVENOUS; SUBCUTANEOUS at 05:30

## 2019-03-02 RX ADMIN — Medication 600 MILLIGRAM(S): at 18:28

## 2019-03-02 RX ADMIN — SODIUM CHLORIDE 3 MILLILITER(S): 9 INJECTION INTRAMUSCULAR; INTRAVENOUS; SUBCUTANEOUS at 21:03

## 2019-03-02 RX ADMIN — Medication 1: at 17:31

## 2019-03-02 RX ADMIN — SIMETHICONE 80 MILLIGRAM(S): 80 TABLET, CHEWABLE ORAL at 23:56

## 2019-03-02 RX ADMIN — Medication 975 MILLIGRAM(S): at 23:56

## 2019-03-02 RX ADMIN — OXYCODONE HYDROCHLORIDE 10 MILLIGRAM(S): 5 TABLET ORAL at 22:33

## 2019-03-02 RX ADMIN — Medication 600 MILLIGRAM(S): at 13:30

## 2019-03-02 RX ADMIN — Medication 975 MILLIGRAM(S): at 12:46

## 2019-03-02 RX ADMIN — Medication 100 MILLIGRAM(S): at 06:14

## 2019-03-02 NOTE — PROGRESS NOTE ADULT - ASSESSMENT
A/P: 38y s/p ex-lap, IFRAH, BS (frozen benign), POD#2, HD#3, stable.  Neuro: c/w po pain meds  CV: Hemodynamically stable, AM labs  HTN, c/w lopressor 5mg q6h prn  Pulm: Saturating well on room air, encourage oob/amb  GI: c/w regular diet  : voiding spontaneously  Heme: c/w lovenox and SCDs for DVT ppx  Endo: T2DM, c/w ISS, FSG  Dispo: Continue routine post-op care    OLEG Sanon PGY2

## 2019-03-02 NOTE — PROGRESS NOTE ADULT - SUBJECTIVE AND OBJECTIVE BOX
38y s/p ex-lap, IFRAH, BS (frozen benign), POD#2, HD#3    INTERVAL HPI/OVERNIGHT EVENTS: Pt seen and examined at bedside.  Pt w no acute complaints.  Denies nausea, vomiting, lightheadedness, dizziness SOB, CP, fevers, chills.  Not yet passing gas, out of bed and ambulating, tolerating PO, is voiding spontaneously without issue.  Pain well controlled.    MEDICATIONS  (STANDING):  acetaminophen   Tablet .. 975 milliGRAM(s) Oral every 6 hours  dextrose 5%. 1000 milliLiter(s) (50 mL/Hr) IV Continuous <Continuous>  dextrose 50% Injectable 12.5 Gram(s) IV Push once  dextrose 50% Injectable 25 Gram(s) IV Push once  dextrose 50% Injectable 25 Gram(s) IV Push once  docusate sodium 100 milliGRAM(s) Oral two times a day  enoxaparin Injectable 40 milliGRAM(s) SubCutaneous daily  ibuprofen  Tablet. 600 milliGRAM(s) Oral every 6 hours  insulin lispro (HumaLOG) corrective regimen sliding scale   SubCutaneous three times a day before meals  simethicone 80 milliGRAM(s) Chew every 6 hours  sodium chloride 0.9% lock flush 3 milliLiter(s) IV Push every 8 hours    MEDICATIONS  (PRN):  dextrose 40% Gel 15 Gram(s) Oral once PRN Blood Glucose LESS THAN 70 milliGRAM(s)/deciliter  glucagon  Injectable 1 milliGRAM(s) IntraMuscular once PRN Glucose LESS THAN 70 milligrams/deciliter  metoprolol tartrate Injectable 5 milliGRAM(s) IV Push every 6 hours PRN IF BP >140/100 and if HR>60  ondansetron Injectable 4 milliGRAM(s) IV Push every 6 hours PRN Nausea  ondansetron Injectable 4 milliGRAM(s) IV Push once PRN Nausea and/or Vomiting  oxyCODONE    IR 5 milliGRAM(s) Oral every 3 hours PRN Moderate Pain (4 - 6)  oxyCODONE    IR 10 milliGRAM(s) Oral every 3 hours PRN Severe Pain (7 - 10)      Allergies    No Known Allergies        12 point ROS negative except as outlined above    Vital Signs Last 24 Hrs  T(C): 36.6 (02 Mar 2019 05:11), Max: 37.2 (01 Mar 2019 20:40)  T(F): 97.9 (02 Mar 2019 05:11), Max: 99 (01 Mar 2019 20:40)  HR: 88 (02 Mar 2019 05:11) (70 - 88)  BP: 135/85 (02 Mar 2019 05:11) (99/56 - 135/85)  RR: 18 (02 Mar 2019 05:11) (18 - 19)  SpO2: 97% (02 Mar 2019 05:11) (97% - 100%)      PHYSICAL EXAM:    GA: NAD, A+0 x 3  CV: RRR  Pulm: CTA BL  Abd: ( + ) BS, soft, nontender, nondistended, no rebound or guarding,   Incision: midline vertical incision w PAUL dressing in place  Extremities: no swelling or calf tenderness, SCDs in place          LABS:                        8.8    10.73 )-----------( 240      ( 02 Mar 2019 05:30 )             33.8     03-02    138  |  103  |  16  ----------------------------<  137<H>  4.3   |  23  |  1.05    Ca    8.5      02 Mar 2019 05:30  Phos  3.3     03-02  Mg     1.7     03-02            RADIOLOGY & ADDITIONAL TESTS:

## 2019-03-02 NOTE — CHART NOTE - NSCHARTNOTEFT_GEN_A_CORE
PA Note  Back note from 1230am    Pt is ordered for continuous pulse ox monitoring.  As per RN, pt was refusing to wear pulse ox.  I explained to the RN the importance of her wearing this and to try and speak with the pt again.  Pt then agreed to wear pulse ox monitoring.  Battery is currently charging and no other charged battery is available at this time.  Battery will be replaced as soon as possible.    Brittaney, PAC  #[id number]

## 2019-03-03 ENCOUNTER — TRANSCRIPTION ENCOUNTER (OUTPATIENT)
Age: 39
End: 2019-03-03

## 2019-03-03 VITALS
DIASTOLIC BLOOD PRESSURE: 69 MMHG | SYSTOLIC BLOOD PRESSURE: 121 MMHG | RESPIRATION RATE: 17 BRPM | HEART RATE: 87 BPM | OXYGEN SATURATION: 99 % | TEMPERATURE: 98 F

## 2019-03-03 LAB
ANION GAP SERPL CALC-SCNC: 12 MMO/L — SIGNIFICANT CHANGE UP (ref 7–14)
BUN SERPL-MCNC: 14 MG/DL — SIGNIFICANT CHANGE UP (ref 7–23)
CALCIUM SERPL-MCNC: 8.1 MG/DL — LOW (ref 8.4–10.5)
CHLORIDE SERPL-SCNC: 103 MMOL/L — SIGNIFICANT CHANGE UP (ref 98–107)
CO2 SERPL-SCNC: 24 MMOL/L — SIGNIFICANT CHANGE UP (ref 22–31)
CREAT SERPL-MCNC: 0.93 MG/DL — SIGNIFICANT CHANGE UP (ref 0.5–1.3)
GLUCOSE BLDC GLUCOMTR-MCNC: 151 MG/DL — HIGH (ref 70–99)
GLUCOSE BLDC GLUCOMTR-MCNC: 156 MG/DL — HIGH (ref 70–99)
GLUCOSE SERPL-MCNC: 147 MG/DL — HIGH (ref 70–99)
HCT VFR BLD CALC: 30.6 % — LOW (ref 34.5–45)
HGB BLD-MCNC: 8.7 G/DL — LOW (ref 11.5–15.5)
MAGNESIUM SERPL-MCNC: 1.7 MG/DL — SIGNIFICANT CHANGE UP (ref 1.6–2.6)
MCHC RBC-ENTMCNC: 21.1 PG — LOW (ref 27–34)
MCHC RBC-ENTMCNC: 28.4 % — LOW (ref 32–36)
MCV RBC AUTO: 74.3 FL — LOW (ref 80–100)
NRBC # FLD: 0 K/UL — LOW (ref 25–125)
PHOSPHATE SERPL-MCNC: 4.1 MG/DL — SIGNIFICANT CHANGE UP (ref 2.5–4.5)
PLATELET # BLD AUTO: 238 K/UL — SIGNIFICANT CHANGE UP (ref 150–400)
PMV BLD: 11.2 FL — SIGNIFICANT CHANGE UP (ref 7–13)
POTASSIUM SERPL-MCNC: 3.7 MMOL/L — SIGNIFICANT CHANGE UP (ref 3.5–5.3)
POTASSIUM SERPL-SCNC: 3.7 MMOL/L — SIGNIFICANT CHANGE UP (ref 3.5–5.3)
RBC # BLD: 4.12 M/UL — SIGNIFICANT CHANGE UP (ref 3.8–5.2)
RBC # FLD: 16.4 % — HIGH (ref 10.3–14.5)
SODIUM SERPL-SCNC: 139 MMOL/L — SIGNIFICANT CHANGE UP (ref 135–145)
WBC # BLD: 11.34 K/UL — HIGH (ref 3.8–10.5)
WBC # FLD AUTO: 11.34 K/UL — HIGH (ref 3.8–10.5)

## 2019-03-03 RX ORDER — LOSARTAN POTASSIUM 100 MG/1
25 TABLET, FILM COATED ORAL DAILY
Qty: 0 | Refills: 0 | Status: DISCONTINUED | OUTPATIENT
Start: 2019-03-03 | End: 2019-03-03

## 2019-03-03 RX ORDER — OXYCODONE HYDROCHLORIDE 5 MG/1
1 TABLET ORAL
Qty: 8 | Refills: 0 | OUTPATIENT
Start: 2019-03-03 | End: 2019-03-05

## 2019-03-03 RX ORDER — ACETAMINOPHEN 500 MG
3 TABLET ORAL
Qty: 0 | Refills: 0 | COMMUNITY

## 2019-03-03 RX ORDER — IBUPROFEN 200 MG
1 TABLET ORAL
Qty: 0 | Refills: 0 | COMMUNITY

## 2019-03-03 RX ADMIN — Medication 600 MILLIGRAM(S): at 00:47

## 2019-03-03 RX ADMIN — Medication 975 MILLIGRAM(S): at 00:47

## 2019-03-03 RX ADMIN — SODIUM CHLORIDE 3 MILLILITER(S): 9 INJECTION INTRAMUSCULAR; INTRAVENOUS; SUBCUTANEOUS at 05:34

## 2019-03-03 RX ADMIN — Medication 600 MILLIGRAM(S): at 05:42

## 2019-03-03 RX ADMIN — Medication 975 MILLIGRAM(S): at 12:05

## 2019-03-03 RX ADMIN — ENOXAPARIN SODIUM 40 MILLIGRAM(S): 100 INJECTION SUBCUTANEOUS at 12:05

## 2019-03-03 RX ADMIN — Medication 1: at 08:28

## 2019-03-03 RX ADMIN — SIMETHICONE 80 MILLIGRAM(S): 80 TABLET, CHEWABLE ORAL at 12:05

## 2019-03-03 RX ADMIN — Medication 600 MILLIGRAM(S): at 06:28

## 2019-03-03 RX ADMIN — Medication 600 MILLIGRAM(S): at 12:05

## 2019-03-03 NOTE — PROGRESS NOTE ADULT - SUBJECTIVE AND OBJECTIVE BOX
38y s/p ex-lap, IFRAH, BS (frozen benign), POD#3, HD#4    INTERVAL HPI/OVERNIGHT EVENTS: Pt seen and examined at bedside.  Pt w no complaints.  Denies nausea, vomiting, SOB, CP, fevers, chills.  Is tolerating PO, voiding spontaneously w/o issue, passing gas, no BM.  Ambulating well, pain is well controlled.      MEDICATIONS  (STANDING):  acetaminophen   Tablet .. 975 milliGRAM(s) Oral every 6 hours  dextrose 5%. 1000 milliLiter(s) (50 mL/Hr) IV Continuous <Continuous>  dextrose 50% Injectable 12.5 Gram(s) IV Push once  dextrose 50% Injectable 25 Gram(s) IV Push once  dextrose 50% Injectable 25 Gram(s) IV Push once  docusate sodium 100 milliGRAM(s) Oral two times a day  enoxaparin Injectable 40 milliGRAM(s) SubCutaneous daily  ibuprofen  Tablet. 600 milliGRAM(s) Oral every 6 hours  insulin lispro (HumaLOG) corrective regimen sliding scale   SubCutaneous three times a day before meals  simethicone 80 milliGRAM(s) Chew every 6 hours  sodium chloride 0.9% lock flush 3 milliLiter(s) IV Push every 8 hours    MEDICATIONS  (PRN):  dextrose 40% Gel 15 Gram(s) Oral once PRN Blood Glucose LESS THAN 70 milliGRAM(s)/deciliter  glucagon  Injectable 1 milliGRAM(s) IntraMuscular once PRN Glucose LESS THAN 70 milligrams/deciliter  metoprolol tartrate Injectable 5 milliGRAM(s) IV Push every 6 hours PRN IF BP >140/100 and if HR>60  ondansetron Injectable 4 milliGRAM(s) IV Push every 6 hours PRN Nausea  ondansetron Injectable 4 milliGRAM(s) IV Push once PRN Nausea and/or Vomiting  oxyCODONE    IR 5 milliGRAM(s) Oral every 3 hours PRN Moderate Pain (4 - 6)  oxyCODONE    IR 10 milliGRAM(s) Oral every 3 hours PRN Severe Pain (7 - 10)      Allergies    No Known Allergies        12 point ROS negative except as outlined above    Vital Signs Last 24 Hrs  T(C): 37.1 (03 Mar 2019 05:40), Max: 37.2 (02 Mar 2019 17:26)  T(F): 98.8 (03 Mar 2019 05:40), Max: 98.9 (02 Mar 2019 17:26)  HR: 90 (03 Mar 2019 05:40) (88 - 95)  BP: 135/78 (03 Mar 2019 05:40) (117/67 - 137/63)  RR: 16 (03 Mar 2019 05:40) (16 - 19)  SpO2: 98% (03 Mar 2019 05:40) (97% - 100%)        PHYSICAL EXAM:    GA: NAD, A+0 x 3  CV: RRR  Pulm: CTA BL  Abd: ( + ) BS, soft, nontender, nondistended, no rebound or guarding,   Incision: midline vertical incision w PAUL dressing  Extremities: no swelling or calf tenderness          LABS:                        8.7    11.34 )-----------( 238      ( 03 Mar 2019 05:55 )             30.6     03-03    139  |  103  |  14  ----------------------------<  147<H>  3.7   |  24  |  0.93    Ca    8.1<L>      03 Mar 2019 05:55  Phos  4.1     03-03  Mg     1.7     03-03            RADIOLOGY & ADDITIONAL TESTS:

## 2019-03-03 NOTE — DISCHARGE NOTE ADULT - PLAN OF CARE
Recovery to baseline function and activity Return to your regular way of eating.  Resume normal activity as tolerated, but no heavy lifting or strenuous activity for 6 weeks.  No driving for next 2 weeks and/or while on narcotic pain medication.  Complete vaginal rest, no tampons, no douching, no tub bathing, no sexual activities for 6 weeks unless otherwise instructed by your doctor.  Call your doctor with any signs and symptoms of infection such as fever, chills, nausea or vomiting.  Call your doctor with redness or swelling at the incision site, fluid leakage or wound separation.  Call your doctor if you're unable to tolerate food or have difficulty urinating.  Call your doctor if you have pain that is not relieved by your prescribed medications.  Notify your doctor with any other concerns.  Follow up with Dr. Roth in 2 weeks, 3/6@1pm.

## 2019-03-03 NOTE — DISCHARGE NOTE ADULT - CARE PROVIDER_API CALL
Adonis Roth (MD)  Gynecologic Oncology; Obstetrics and Gynecology  [address], [address] [place] [address]  Phone: [phone]  Fax: [phone]  Follow Up Time:

## 2019-03-03 NOTE — DISCHARGE NOTE ADULT - HOSPITAL COURSE
Patient admitted for scheduled surgery on 2/28.  S/p ex-lap, IFRAH, BS, surgery uncomplicated.  POD#0, patient was advanced to clear liquid diet, on a PCA.  POD#1, pt was advanced to regular diet, transitioned to oral pain meds, lobato was discontinued and patient voided without issue.  POD#3, patient was discharged in stable condition tolerating a regular diet, ambulating, pain well controlled, voiding spontaneously with stable labs and vital signs.  Patient will have close follow-up with Dr. Roth in his office.  Patient to have staples remain in place until her post-op follow-up visit.

## 2019-03-03 NOTE — DISCHARGE NOTE ADULT - INSTRUCTIONS
NOTIFY MD OF fever, chills, nausea, vomiting, pain unrelieved by pain meds, difficulty urinating, inability to tolerate food

## 2019-03-03 NOTE — PROGRESS NOTE ADULT - ATTENDING COMMENTS
Patient seen and examined on rounds at 9am. Agree with resident note including A/P. D/c instructions reviewed. All questions answered.
patient seen and examined, no n/v/cp/sob, tolerated reg diet in AM, voiding freely and ambulated in the room to chair  abd soft/nt/nd incision c/d/i  Ext NT  Plan  Patient is overall recovering well postop  concern for undiagnosed BONIFACIO< continue pulse ox monitoring  pain controlled  DM2 continue sliding scale  HTN restart losartan tomorrow, continue lopressor.

## 2019-03-03 NOTE — DISCHARGE NOTE ADULT - MEDICATION SUMMARY - MEDICATIONS TO TAKE
I will START or STAY ON the medications listed below when I get home from the hospital:    Tylenol 325 mg oral tablet  -- 3 tab(s) by mouth every 6 hours, As Needed  -- Indication: For for mild pain, as needed    Motrin 600 mg oral tablet  -- 1 tab(s) by mouth every 6 hours, As Needed  -- Indication: For for moderate pain, as needed    oxyCODONE 5 mg oral tablet  -- 1 tab(s) by mouth every 6 hours MDD:4  -- Caution federal law prohibits the transfer of this drug to any person other  than the person for whom it was prescribed.  It is very important that you take or use this exactly as directed.  Do not skip doses or discontinue unless directed by your doctor.  May cause drowsiness.  Alcohol may intensify this effect.  Use care when operating dangerous machinery.  This prescription cannot be refilled.  Using more of this medication than prescribed may cause serious breathing problems.    -- Indication: For for severe pain, as needed    aspirin 81 mg oral tablet  -- 1 tab(s) by mouth once a day  -- Indication: For Home med    losartan 25 mg oral tablet  -- 1 tab(s) by mouth once a day (in the evening)  -- Indication: For Home med    glyBURIDE 5 mg oral tablet  -- 1 tab(s) by mouth 2 times a day  -- Indication: For Home med    metFORMIN 1000 mg oral tablet  -- 1 tab(s) by mouth 2 times a day  -- Indication: For Home med    simvastatin 20 mg oral tablet  -- 1 tab(s) by mouth once a day (at bedtime)  -- Indication: For Home med

## 2019-03-03 NOTE — PROGRESS NOTE ADULT - ASSESSMENT
A/P: 38y s/p ex-lap, IFRAH, BS (frozen benign), POD#3, HD#4, stable.  Neuro: c/w po pain meds prn  CV: Hemodynamically stable, AM labs stable  HTN, home BP meds  Pulm: Saturating well on room air, encourage oob/amb  GI: c/w regular diet  : voiding spontaneously  Heme: c/w lovenox and SCDs for DVT ppx  Endo: T2DM, c/w ISS, FSG  Dispo: Continue routine post-op care, discharge planning for today    OLEG Sanon PGY2

## 2019-03-03 NOTE — DISCHARGE NOTE ADULT - PATIENT PORTAL LINK FT
You can access the [facility] Patient Portal, offered by [facility] [place], by registering with the following website: [email or web address]

## 2019-03-03 NOTE — DISCHARGE NOTE ADULT - CARE PLAN
Principal Discharge DX:	Post-operative state  Goal:	Recovery to baseline function and activity  Assessment and plan of treatment:	Return to your regular way of eating.  Resume normal activity as tolerated, but no heavy lifting or strenuous activity for 6 weeks.  No driving for next 2 weeks and/or while on narcotic pain medication.  Complete vaginal rest, no tampons, no douching, no tub bathing, no sexual activities for 6 weeks unless otherwise instructed by your doctor.  Call your doctor with any signs and symptoms of infection such as fever, chills, nausea or vomiting.  Call your doctor with redness or swelling at the incision site, fluid leakage or wound separation.  Call your doctor if you're unable to tolerate food or have difficulty urinating.  Call your doctor if you have pain that is not relieved by your prescribed medications.  Notify your doctor with any other concerns.  Follow up with Dr. Roth in 2 weeks, 3/6@1pm.

## 2019-03-04 ENCOUNTER — INBOUND DOCUMENT (OUTPATIENT)
Age: 39
End: 2019-03-04

## 2019-03-04 LAB — NON-GYNECOLOGICAL CYTOLOGY STUDY: SIGNIFICANT CHANGE UP

## 2019-03-04 RX ORDER — OXYCODONE HYDROCHLORIDE 5 MG/1
12 TABLET ORAL
Qty: 12 | Refills: 0 | OUTPATIENT
Start: 2019-03-04

## 2019-03-08 DIAGNOSIS — Z71.89 OTHER SPECIFIED COUNSELING: ICD-10-CM

## 2019-03-08 LAB — SURGICAL PATHOLOGY STUDY: SIGNIFICANT CHANGE UP

## 2019-03-11 ENCOUNTER — APPOINTMENT (OUTPATIENT)
Dept: GYNECOLOGIC ONCOLOGY | Facility: CLINIC | Age: 39
End: 2019-03-11
Payer: MEDICAID

## 2019-03-11 PROCEDURE — 99024 POSTOP FOLLOW-UP VISIT: CPT

## 2019-03-12 PROBLEM — I10 ESSENTIAL (PRIMARY) HYPERTENSION: Chronic | Status: ACTIVE | Noted: 2019-02-14

## 2019-03-12 PROBLEM — C54.1 MALIGNANT NEOPLASM OF ENDOMETRIUM: Chronic | Status: ACTIVE | Noted: 2019-02-14

## 2019-03-12 PROBLEM — E11.9 TYPE 2 DIABETES MELLITUS WITHOUT COMPLICATIONS: Chronic | Status: ACTIVE | Noted: 2019-02-14

## 2019-03-12 PROBLEM — E78.5 HYPERLIPIDEMIA, UNSPECIFIED: Chronic | Status: ACTIVE | Noted: 2019-02-14

## 2019-03-12 PROBLEM — E66.9 OBESITY, UNSPECIFIED: Chronic | Status: ACTIVE | Noted: 2019-02-14

## 2019-03-13 ENCOUNTER — APPOINTMENT (OUTPATIENT)
Dept: GYNECOLOGIC ONCOLOGY | Facility: CLINIC | Age: 39
End: 2019-03-13
Payer: MEDICAID

## 2019-03-13 PROCEDURE — 99024 POSTOP FOLLOW-UP VISIT: CPT

## 2019-03-18 ENCOUNTER — APPOINTMENT (OUTPATIENT)
Dept: GYNECOLOGIC ONCOLOGY | Facility: CLINIC | Age: 39
End: 2019-03-18
Payer: MEDICAID

## 2019-03-18 ENCOUNTER — INBOUND DOCUMENT (OUTPATIENT)
Age: 39
End: 2019-03-18

## 2019-03-18 VITALS
WEIGHT: 293 LBS | BODY MASS INDEX: 43.4 KG/M2 | SYSTOLIC BLOOD PRESSURE: 134 MMHG | HEIGHT: 69 IN | DIASTOLIC BLOOD PRESSURE: 62 MMHG

## 2019-03-18 PROCEDURE — 99024 POSTOP FOLLOW-UP VISIT: CPT

## 2019-03-18 NOTE — DISCUSSION/SUMMARY
[Clean] : was clean [] : was  [None] : had no drainage [Serous] : had serous drainage [Normal Skin] : normal appearance [Normal Skin Turgor] : normal skin turgor [Doing Well] : is doing well [Excellent Pain Control] : has excellent pain control [No Sign of Infection] : is showing no signs of infection [1] : 1 [Reviewed] : reviewed [Erythema] : was not erythematous [Ecchymosis] : was not ecchymotic [Firm] : soft [Tender] : nontender [Rebound] : no rebound tenderness [Guarding] : no guarding [Incisional Hernia] : no incisional hernia [Mass] : no palpable mass [FreeTextEntry1] : Superficially open, with minimal serous drainage no sign of infection [de-identified] : /GI function unremarkable [de-identified] : tolerating PO without nausea or vomiting

## 2019-03-18 NOTE — REASON FOR VISIT
[Post Op] : post op visit [Staple Removal] : staple removal [de-identified] : 2/28/19 [de-identified] : Patient feels well, not taking pain medication.  She denies fevers, chills or abdominal pain.  She denies CP, or SOB. She notes her incision is slightly open with clear drainage.  She notes regular bowel movements.  She denies vaginal bleeding.

## 2019-03-19 NOTE — DISCUSSION/SUMMARY
[Clean] : was clean [] : was  [Ecchymosis] : was not ecchymotic [None] : had no drainage [Erythema] : was not erythematous [Normal Skin] : normal appearance [Serous] : had serous drainage [Firm] : soft [Normal Skin Turgor] : normal skin turgor [Tender] : nontender [Rebound] : no rebound tenderness [Guarding] : no guarding [Incisional Hernia] : no incisional hernia [Mass] : no palpable mass [Excellent Pain Control] : has excellent pain control [Doing Well] : is doing well [1] : 1 [No Sign of Infection] : is showing no signs of infection [Reviewed] : reviewed [FreeTextEntry1] : Superficially open, with minimal serous drainage no sign of infection  [de-identified] : tolerating PO without nausea or vomiting [de-identified] : /GI function unremarkable

## 2019-03-19 NOTE — REASON FOR VISIT
[Staple Removal] : staple removal [Post Op] : post op visit [de-identified] : 2/28/19 [de-identified] : IFRAH/BS  [de-identified] : Patient feels well, not taking pain medication.  She denies fevers, chills or abdominal pain.  She denies CP, or SOB. She notes her incision is slightly open with clear drainage.  She notes regular bowel movements.  She denies vaginal bleeding.

## 2019-03-25 ENCOUNTER — APPOINTMENT (OUTPATIENT)
Dept: GYNECOLOGIC ONCOLOGY | Facility: CLINIC | Age: 39
End: 2019-03-25
Payer: MEDICAID

## 2019-03-25 PROCEDURE — 99024 POSTOP FOLLOW-UP VISIT: CPT

## 2019-04-01 ENCOUNTER — APPOINTMENT (OUTPATIENT)
Dept: GYNECOLOGIC ONCOLOGY | Facility: CLINIC | Age: 39
End: 2019-04-01
Payer: MEDICAID

## 2019-04-01 VITALS
HEIGHT: 69 IN | DIASTOLIC BLOOD PRESSURE: 85 MMHG | WEIGHT: 293 LBS | SYSTOLIC BLOOD PRESSURE: 126 MMHG | BODY MASS INDEX: 43.4 KG/M2

## 2019-04-01 PROCEDURE — 99024 POSTOP FOLLOW-UP VISIT: CPT

## 2019-04-05 NOTE — DISCUSSION/SUMMARY
[Clean] : was clean [] : was  [Erythema] : was not erythematous [Ecchymosis] : was not ecchymotic [None] : had no drainage [Serous] : had serous drainage [Normal Skin] : normal appearance [Normal Skin Turgor] : normal skin turgor [Doing Well] : is doing well [FreeTextEntry1] : Small opening - Wound care done. [FreeTextEntry9] : s/nt/nd; no CVAT [de-identified] : deferred [de-identified] : /GI function unremarkable [de-identified] : tolerating PO without nausea or vomiting

## 2019-04-05 NOTE — REASON FOR VISIT
[Post Op] : post op visit [de-identified] : 2/28/19 [de-identified] : IFRAH/BS  [de-identified] : She RTO feeling well and notes no VB or VD. Would care continues. No fever. No GI or  concerns.

## 2019-04-05 NOTE — ASSESSMENT
[FreeTextEntry1] : We disc her wound care. We reviewed the TB discussion. We disc the implications of the ov conservation. We discussed the options for further mgmt - Reop for oophorectomy and macie sampling with possible adjuvant RT vs adj RT. She is disinclined for reop and would prefer to pursue a Rad Onc consult. Her instructions were discussed. F/U for further wound care.

## 2019-04-15 ENCOUNTER — APPOINTMENT (OUTPATIENT)
Dept: GYNECOLOGIC ONCOLOGY | Facility: CLINIC | Age: 39
End: 2019-04-15
Payer: MEDICAID

## 2019-04-15 VITALS
HEIGHT: 69 IN | BODY MASS INDEX: 43.4 KG/M2 | SYSTOLIC BLOOD PRESSURE: 165 MMHG | DIASTOLIC BLOOD PRESSURE: 98 MMHG | WEIGHT: 293 LBS

## 2019-04-15 DIAGNOSIS — Z51.89 ENCOUNTER FOR OTHER SPECIFIED AFTERCARE: ICD-10-CM

## 2019-04-15 PROCEDURE — 99024 POSTOP FOLLOW-UP VISIT: CPT

## 2019-04-18 ENCOUNTER — OUTPATIENT (OUTPATIENT)
Dept: OUTPATIENT SERVICES | Facility: HOSPITAL | Age: 39
LOS: 1 days | Discharge: HOME | End: 2019-04-18

## 2019-04-18 DIAGNOSIS — Z98.890 OTHER SPECIFIED POSTPROCEDURAL STATES: Chronic | ICD-10-CM

## 2019-04-19 ENCOUNTER — APPOINTMENT (OUTPATIENT)
Dept: RADIATION ONCOLOGY | Facility: CLINIC | Age: 39
End: 2019-04-19
Payer: MEDICAID

## 2019-04-23 PROBLEM — Z51.89 VISIT FOR WOUND CARE: Status: ACTIVE | Noted: 2019-03-18

## 2019-04-23 NOTE — DISCUSSION/SUMMARY
[Clean] : was clean [] : was  [None] : had no drainage [Normal Skin] : normal appearance [Serous] : had serous drainage [Normal Skin Turgor] : normal skin turgor [Doing Well] : is doing well [Excellent Pain Control] : has excellent pain control [No Sign of Infection] : is showing no signs of infection [Reviewed] : reviewed [1] : 1 [Erythema] : was not erythematous [Ecchymosis] : was not ecchymotic [Firm] : soft [Tender] : nontender [Rebound] : no rebound tenderness [Guarding] : no guarding [Incisional Hernia] : no incisional hernia [Mass] : no palpable mass [FreeTextEntry1] : Superficially open, no drainage no sign of infection  [de-identified] : /GI function unremarkable [de-identified] : tolerating PO without nausea or vomiting

## 2019-04-23 NOTE — REASON FOR VISIT
[Post Op] : post op visit [Staple Removal] : staple removal [de-identified] : 2/28/19 [de-identified] : IFRAH/BS  [de-identified] : Patient feels well, not taking pain medication.  She denies fevers, chills or abdominal pain.  She denies CP, or SOB. She notes her incision is slightly open with no drainage.  She notes regular bowel movements.  She denies vaginal bleeding.

## 2019-04-30 ENCOUNTER — APPOINTMENT (OUTPATIENT)
Dept: RADIATION ONCOLOGY | Facility: CLINIC | Age: 39
End: 2019-04-30
Payer: MEDICAID

## 2019-04-30 VITALS
TEMPERATURE: 98.42 F | BODY MASS INDEX: 43.4 KG/M2 | WEIGHT: 293 LBS | HEART RATE: 88 BPM | HEIGHT: 69 IN | OXYGEN SATURATION: 99 % | RESPIRATION RATE: 16 BRPM

## 2019-04-30 DIAGNOSIS — R06.02 SHORTNESS OF BREATH: ICD-10-CM

## 2019-04-30 DIAGNOSIS — Z87.898 PERSONAL HISTORY OF OTHER SPECIFIED CONDITIONS: ICD-10-CM

## 2019-04-30 DIAGNOSIS — Z01.818 ENCOUNTER FOR OTHER PREPROCEDURAL EXAMINATION: ICD-10-CM

## 2019-04-30 DIAGNOSIS — Z86.018 PERSONAL HISTORY OF OTHER BENIGN NEOPLASM: ICD-10-CM

## 2019-04-30 DIAGNOSIS — N93.9 ABNORMAL UTERINE AND VAGINAL BLEEDING, UNSPECIFIED: ICD-10-CM

## 2019-04-30 PROCEDURE — 99204 OFFICE O/P NEW MOD 45 MIN: CPT | Mod: 25,GC

## 2019-05-19 NOTE — HISTORY OF PRESENT ILLNESS
[FreeTextEntry1] : Ms. Jessica Cameron is a 39 y/o woman with recent diagnosis of endometeriod adenocarcinoma.  Her clinical history is summarized below.\par \par She has a history of PCOS and uterine fibroids and had been experiencing abnormal uterine bleeding since 10/2017.\par \par 9/8/2018:  Endometrial biopsy demonstrates moderately differentiated endometrioid adenocarcinoma, grade 2.  Pap smear is negative for malignant lesions.\par 10/10/18:  Mary with Dr. Adonis Roth and discussed the findings and therapeutic options.\par 10/15/18:  Endometrial biopsy from 9/8/18 reviewed as FIGO grade 1 institutionally.\par 10/17/2018:  CT A/P with contrast demonstrates intact periuterine fascial planes.  Subcentimeter para-aortic and pelvic sidewall nodes.  Likely adrenal adenoma noted on scan.\par 11/8/18:  MRI abdomen for hepatic steatosis shows no evidence of malignancy.\par 12/5/18:  Met with Dr. Eriberto Decker and discussed the adrenal nodule.  Not recommended for adrenalectomy.  Not recommended for cholecystectomy.\par 2/28/19:  Total hysterectomy, Salpingectomy revealed well differentiated endometrioid adenocarcinoma, FIGO I, infiltrating into the outer half of the myometrium with MELF type pattern of invasion.  Cervix is benign.  Fallopian tubes are benign.  Myometrial invasion is 12mm/18mm; adenomyosis is present and involved by carcinoma.  Lower uterina segment and cervix not involved.  LVSI present.  No lymph nodes submitted. Consistent with pT1bNx.  Pelvic wash negative for malignant cells.  No evidence of mismatch repair mutation.\par \par Her surgery was complicated by wound dehiscence and she has been continuing to heal through secondary intent.\par \par In followup tumor board discussion regarding management, oophorectomy with PLND +/- RT vs. RT was discussed.  Ms. Cameron states she is not interested in further surgery at this time.  She is now approximately 9 weeks out from her procedure.\par \par She denies vaginal bleeding, discharge, change in urinary or bowel symptoms.  Patient presents to further discuss radiation therapy.

## 2019-05-19 NOTE — PHYSICAL EXAM
[Obese] : obese [Sclera] : the sclera and conjunctiva were normal [Extraocular Movements] : extraocular movements were intact [Outer Ear] : the ears and nose were normal in appearance [Hearing Threshold Finger Rub Not [place]] : hearing was normal [Examination Of The Oral Cavity] : the lips and gums were normal [Normal External Genitalia] : normal external genitalia  [Normal Vaginal Cuff] : vaginal cuff without lesion or nodularity [Normal] : oriented to person, place and time, the affect was normal, the mood was normal and not anxious [General Appearance - In No Acute Distress] : in no acute distress [General Appearance - Alert] : alert [Normal Vagina] : normal vagina without lesions or masses [de-identified] : Wound dressing clean, dry, intact

## 2019-05-19 NOTE — VITALS
[Least Pain Intensity: 0/10] : 0/10 [80: Normal activity with effort; some signs or symptoms of disease.] : 80: Normal activity with effort; some signs or symptoms of disease.  [ECOG Performance Status: 1 - Restricted in physically strenuous activity but ambulatory and able to carry out work of a light or sedentary nature] : Performance Status: 1 - Restricted in physically strenuous activity but ambulatory and able to carry out work of a light or sedentary nature, e.g., light house work, office work [Maximal Pain Intensity: 1/10] : 1/10

## 2019-06-18 ENCOUNTER — APPOINTMENT (OUTPATIENT)
Dept: GYNECOLOGIC ONCOLOGY | Facility: CLINIC | Age: 39
End: 2019-06-18
Payer: MEDICAID

## 2019-06-18 VITALS
SYSTOLIC BLOOD PRESSURE: 125 MMHG | BODY MASS INDEX: 43.4 KG/M2 | WEIGHT: 293 LBS | DIASTOLIC BLOOD PRESSURE: 76 MMHG | HEIGHT: 69 IN

## 2019-06-18 PROCEDURE — 99214 OFFICE O/P EST MOD 30 MIN: CPT

## 2020-02-11 DIAGNOSIS — Z87.898 PERSONAL HISTORY OF OTHER SPECIFIED CONDITIONS: ICD-10-CM

## 2020-02-11 DIAGNOSIS — Z01.818 ENCOUNTER FOR OTHER PREPROCEDURAL EXAMINATION: ICD-10-CM

## 2020-02-24 ENCOUNTER — APPOINTMENT (OUTPATIENT)
Dept: SURGERY | Facility: CLINIC | Age: 40
End: 2020-02-24
Payer: MEDICAID

## 2020-02-24 VITALS
HEART RATE: 95 BPM | DIASTOLIC BLOOD PRESSURE: 85 MMHG | TEMPERATURE: 208.94 F | WEIGHT: 293 LBS | RESPIRATION RATE: 16 BRPM | BODY MASS INDEX: 44.41 KG/M2 | SYSTOLIC BLOOD PRESSURE: 121 MMHG | OXYGEN SATURATION: 98 % | HEIGHT: 68 IN

## 2020-02-24 DIAGNOSIS — E11.9 TYPE 2 DIABETES MELLITUS W/OUT COMPLICATIONS: ICD-10-CM

## 2020-02-24 DIAGNOSIS — I10 ESSENTIAL (PRIMARY) HYPERTENSION: ICD-10-CM

## 2020-02-24 DIAGNOSIS — Z85.42 PERSONAL HISTORY OF MALIGNANT NEOPLASM OF OTHER PARTS OF UTERUS: ICD-10-CM

## 2020-02-24 DIAGNOSIS — E66.01 MORBID (SEVERE) OBESITY DUE TO EXCESS CALORIES: ICD-10-CM

## 2020-02-24 PROCEDURE — 99205 OFFICE O/P NEW HI 60 MIN: CPT

## 2020-02-24 RX ORDER — SIMVASTATIN 80 MG/1
TABLET, FILM COATED ORAL
Refills: 0 | Status: DISCONTINUED | COMMUNITY
End: 2020-02-24

## 2020-02-24 RX ORDER — GLYBURIDE 2.5 MG/1
TABLET ORAL
Refills: 0 | Status: DISCONTINUED | COMMUNITY
End: 2020-02-24

## 2020-02-24 RX ORDER — VARENICLINE TARTRATE 1 MG/1
TABLET, FILM COATED ORAL
Refills: 0 | Status: ACTIVE | COMMUNITY

## 2020-02-24 NOTE — CONSULT LETTER
[Dear  ___] : Dear  [unfilled], [Consult Letter:] : I had the pleasure of evaluating your patient, [unfilled]. [Consult Closing:] : Thank you very much for allowing me to participate in the care of this patient.  If you have any questions, please do not hesitate to contact me. [Please see my note below.] : Please see my note below. [FreeTextEntry3] : Catrachito Bone MD, FACS, FASMBS\par Chief Division of Minimally Invasive Surgery\par Co-Director of Bariatric Surgery \par [facility]\[place] [address] [Sincerely,] : Sincerely,

## 2020-02-24 NOTE — ASSESSMENT
[FreeTextEntry1] : 39-year-old female 5 foot 8 315 pounds with a BMI of 47.2 she would like to be considered for sleeve gastrectomy I believe she would be an excellent candidate she will undergo her usual medical nutritional and psychological work-up attend a preoperative support group meeting and return for second office visit once all these are completed the risks benefits and expectations were discussed at length with the patient all of her questions were answered

## 2020-02-24 NOTE — HISTORY OF PRESENT ILLNESS
[de-identified] :  STEVEN BLEDSOE is a 39 year old female here for consultation for weight loss surgery. 39-year-old female 5 foot 8 315 pounds with a BMI 47.2 she has been having most of her adult life she has been on multiple diet programs in the past losing up to 30 pounds at any one time always gaining that weight back she is looking for more permanent solution to her weight loss problem she is interested in a sleeve gastrectomy.  She has comorbid conditions of diabetes.

## 2020-02-24 NOTE — PHYSICAL EXAM
[Obese, well nourished, in no acute distress] : obese, well nourished, in no acute distress [Normal] : grossly intact [de-identified] : clear, diminished bilaterally

## 2022-05-16 NOTE — H&P PST ADULT - FUNCTIONAL LEVEL PRIOR: TOILETING
Received Dermatopathology Report from [facility]. Put in TB bin for reivew.
0 = independent

## 2022-09-16 ENCOUNTER — APPOINTMENT (OUTPATIENT)
Dept: GYNECOLOGIC ONCOLOGY | Facility: CLINIC | Age: 42
End: 2022-09-16

## 2022-09-16 VITALS
DIASTOLIC BLOOD PRESSURE: 94 MMHG | WEIGHT: 291 LBS | BODY MASS INDEX: 44.1 KG/M2 | HEART RATE: 78 BPM | SYSTOLIC BLOOD PRESSURE: 164 MMHG | HEIGHT: 68 IN

## 2022-09-16 DIAGNOSIS — Z80.8 FAMILY HISTORY OF MALIGNANT NEOPLASM OF OTHER ORGANS OR SYSTEMS: ICD-10-CM

## 2022-09-16 DIAGNOSIS — Z80.41 FAMILY HISTORY OF MALIGNANT NEOPLASM OF OVARY: ICD-10-CM

## 2022-09-16 PROCEDURE — 99205 OFFICE O/P NEW HI 60 MIN: CPT

## 2022-09-16 RX ORDER — SIMVASTATIN 80 MG/1
TABLET, FILM COATED ORAL
Refills: 0 | Status: ACTIVE | COMMUNITY

## 2022-10-02 ENCOUNTER — NON-APPOINTMENT (OUTPATIENT)
Age: 42
End: 2022-10-02

## 2022-11-20 ENCOUNTER — OUTPATIENT (OUTPATIENT)
Dept: OUTPATIENT SERVICES | Facility: HOSPITAL | Age: 42
LOS: 1 days | End: 2022-11-20
Payer: MEDICAID

## 2022-11-20 ENCOUNTER — RESULT REVIEW (OUTPATIENT)
Age: 42
End: 2022-11-20

## 2022-11-20 ENCOUNTER — APPOINTMENT (OUTPATIENT)
Dept: ULTRASOUND IMAGING | Facility: IMAGING CENTER | Age: 42
End: 2022-11-20
Payer: MEDICAID

## 2022-11-20 DIAGNOSIS — C54.1 MALIGNANT NEOPLASM OF ENDOMETRIUM: ICD-10-CM

## 2022-11-20 DIAGNOSIS — Z98.890 OTHER SPECIFIED POSTPROCEDURAL STATES: Chronic | ICD-10-CM

## 2022-11-20 PROCEDURE — 76856 US EXAM PELVIC COMPLETE: CPT

## 2022-11-20 PROCEDURE — 76830 TRANSVAGINAL US NON-OB: CPT

## 2022-11-20 PROCEDURE — 76856 US EXAM PELVIC COMPLETE: CPT | Mod: 26,59

## 2022-11-20 PROCEDURE — 76830 TRANSVAGINAL US NON-OB: CPT | Mod: 26

## 2022-12-07 ENCOUNTER — NON-APPOINTMENT (OUTPATIENT)
Age: 42
End: 2022-12-07

## 2022-12-16 ENCOUNTER — OUTPATIENT (OUTPATIENT)
Dept: OUTPATIENT SERVICES | Facility: HOSPITAL | Age: 42
LOS: 1 days | Discharge: HOME | End: 2022-12-16

## 2022-12-16 DIAGNOSIS — Z15.09 GENETIC SUSCEPTIBILITY TO OTHER MALIGNANT NEOPLASM: ICD-10-CM

## 2022-12-16 DIAGNOSIS — Z98.890 OTHER SPECIFIED POSTPROCEDURAL STATES: Chronic | ICD-10-CM

## 2022-12-21 ENCOUNTER — APPOINTMENT (OUTPATIENT)
Dept: HEMATOLOGY ONCOLOGY | Facility: CLINIC | Age: 42
End: 2022-12-21

## 2022-12-21 ENCOUNTER — LABORATORY RESULT (OUTPATIENT)
Age: 42
End: 2022-12-21

## 2023-01-03 NOTE — DISCUSSION/SUMMARY
[FreeTextEntry1] : REASON FOR CONSULT\par Jessica Cameron is a 42-year-old female who was referred by Dr. Adonis Roth for cancer genetic counseling and risk assessment due to a family history of Hereditary Breast and Ovarian Cancer (HBOC) syndrome, familial BRCA2 mutation. \par \par RELEVANT MEDICAL HISTORY\par Ms. Cameron was diagnosed with endometrial cancer (stage IB endometrioid adenocarcinoma, MMR intact) at age 38. She was treated with hysterectomy and bilateral salpingectomy (ovaries intact) 02/28/2019. In addition, she has a family history of HBOC. Her sister pursued genetic testing using [facility] and was identified to have a pathogenic mutation in the BRCA2 gene ([id number]). A family sharing letter was reviewed. \par \par OTHER MEDICAL AND SURGICAL HISTORY:\par Diabetes. \par \par PAST OB/GYN HISTORY:\par Obstetrical History: G0\par Age at Menarche: 12\par Surgical menopause with LMP at age 38\par Age at First Live Birth: N/A\par Oral Contraceptive Use: No\par Hormone Replacement Therapy: No\par H/o irregular menses since teenager, would skip many months between periods \par \par CANCER SCREENING HISTORY:  \par Breast: None. \par GYN: Last transvaginal ultrasound 11/20/2022, normal. \par Colon: None\par Skin: None. No concerns reported today. \par \par SOCIAL HISTORY:\par •	Tobacco-product use: Yes, formerly, quit 2 months ago. Smoked pack/day from age 18 to 42. \par \par FAMILY HISTORY:\par Maternal and paternal ancestry was reported as Portuguese. No Ashkenazi Alevism heritage reported. A detailed family history of cancer was ascertained, see below and scanned chart for pedigree. \par \par To Ms. Cameron’s knowledge, no one other than her sister in the family has had germline testing for cancer susceptibility.  \par 	\par 	RISK ASSESSMENT:\par Ms. Cameron has a 50% chance of having the same BRCA2 mutation as her sister. Given her personal history of early-onset endometrial cancer, we recommended genetic testing for a guidelines-based breast and gyn cancers panel. This test analyzes 19 genes: SHEELA, BARD1, BRCA1, BRCA2, BRIP1, CDH1, CHEK2, EPCAM, MLH1, MSH2, MSH6, NF1, PALB2, PMS2, PTEN, RAD51C, RAD51D, STK11, TP53.\par \par We discussed the risks, benefits and limitations, and implications of genetic testing. We also discussed the psychosocial implications of genetic testing. Possible test results were reviewed with Ms. Cameron, along with associated medical management options. The Genetic Information Non-discrimination Act (ANA) was also reviewed.\par \par Ms. Cameron consented to the above-mentioned genetic testing panel. Blood was drawn in our laboratory and sent to [facility] today.\par \par PLAN:\par \par 1.	Blood drawn today will be sent to [facility] for analysis. \par 2.	We will contact Ms. Cameron once the results are available and will schedule a follow-up appointment, as needed. Results generally return in 2-3 weeks from the day the sample kit is mailed.\par 3.	Family member’s report(s) will be scanned to [facility] secure file cabinet.\par \par \par For any additional questions please call [facility] at [phone]. \par \par \par Sean Isbell, MS, [facility]\par Genetic Counselor, [facility]\par \par \par CC: \par Dr. Adonis Roth\par

## 2023-01-05 ENCOUNTER — NON-APPOINTMENT (OUTPATIENT)
Age: 43
End: 2023-01-05

## 2023-01-05 NOTE — DISCUSSION/SUMMARY
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 Our team will reach out to her to set up a follow-up collab appointment with myself and Dr. Higinio Boland to review the implications for her and family members. Report was sent for scanning and released to the patient via the [facility] lab portal. \par \par Sean Isbell MS, [facility] [place]\par Genetic Counselor

## 2023-02-15 ENCOUNTER — OUTPATIENT (OUTPATIENT)
Dept: OUTPATIENT SERVICES | Facility: HOSPITAL | Age: 43
LOS: 1 days | Discharge: HOME | End: 2023-02-15

## 2023-02-15 DIAGNOSIS — Z15.09 GENETIC SUSCEPTIBILITY TO OTHER MALIGNANT NEOPLASM: ICD-10-CM

## 2023-02-15 DIAGNOSIS — Z98.890 OTHER SPECIFIED POSTPROCEDURAL STATES: Chronic | ICD-10-CM

## 2023-02-22 ENCOUNTER — APPOINTMENT (OUTPATIENT)
Dept: HEMATOLOGY ONCOLOGY | Facility: CLINIC | Age: 43
End: 2023-02-22
Payer: MEDICAID

## 2023-02-22 PROCEDURE — 99204 OFFICE O/P NEW MOD 45 MIN: CPT | Mod: 95

## 2023-03-08 NOTE — ASSESSMENT
[FreeTextEntry1] : The visit was provided via telehealth using real-time 2-way audio visual technology. The patient, Jessica Cameron, was located at home, [address] [place] [address], at the time of the visit. The Genetic Counselor, Sean Isbell, and Physician, Dr. Higinio Boland, were located at the medical office located in [place], [place] at the time of the visit. The patient, Jessica Cameron and Providers participated in the telehealth encounter. Consent for telehealth services was given on 02/22/2023 by the patient, Jessica Cameron. \par \par REASON FOR CONSULT\par Jessica Cameron is a 42-year-old female who was seen 02/22/2023 for a discussion regarding her BRCA2 positive and CHEK2 variant of uncertain significance (VUS) genetic testing results related to hereditary cancer predisposition. \par \par Ms. Cameron was originally seen at [facility] on 12/21/2022 for hereditary cancer predisposition risk assessment. She has a personal history of endometrial cancer (stage IB endometrioid adenocarcinoma, MMR intact) at age 38. In addition, her sister pursued genetic testing using [facility] and was identified to have a pathogenic mutation in the BRCA2 gene (c.8754+1G>A). Ms. Cameron decided to pursue genetic testing using a guidelines-based breast and gyn cancers panel (19 genes) offered by [employer]. \par \par TEST RESULTS: \par BRCA2 POSITIVE (c.8754+1G>A (Splice donor)\par CHEK2 VARIANT OF UNCERTAIN SIGNIFICANCE (VUS) (c.246_260del; [id number])\par \par NO pathogenic (disease-causing) variants or additional VUSs were detected in the following genes: SHEELA*, BARD1, BRCA1, BRCA2, BRIP1, CDH1, CHEK2, EPCAM*, MLH1*, MSH2*, MSH6*, NF1*,\par PALB2, PMS2*, PTEN*, RAD51C, RAD51D, STK11, TP53\par \par RESULTS INTERPRETATION AND ASSESSMENT \par We informed Ms. Cameron that she tested positive for a pathogenic mutation in the BRCA2 gene. 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 HBOC is an autosomal-dominant inherited cancer predisposition syndrome. Ms. Cameron was informed that individuals with a pathogenic BRCA2 mutation have increased risks for the following: \par  \par FEMALE BREAST CANCER RISK:  \par Lifetime risk to develop female breast cancer is estimated to be 61-77% compared to the general population risk of 12.9%.   \par  \par MALE BREAST CANCER RISK:  \par Lifetime risk to develop male breast cancer is estimated to be up to 10% compared to the general population risk of <1%   \par   \par OVARIAN CANCER RISK:  \par Lifetime risk to develop ovarian cancer is estimated to be 11-25% compared to the general population risk of 1.2%. Of note, the risk for ovarian cancer by age 30 is <1% and by age 50 the risk is 1-3%. \par  \par PROSTATE CANCER RISK:  \par Risk to develop prostate cancer by age 65 is estimated to be 5-7%. Risk by age 85 is estimated to be 41-46% compared to the general population risk of 12.1% (Ferny et al. 2020,  Urology, estimates derived from adjusted numbers to account for higher prostate cancer risk estimates for men undergoing PSA screening at regular intervals). \par  \par PANCREATIC CANCER RISK:  \par Lifetime risk to develop pancreatic cancer is estimated to be up to 7% compared to the general population risk of 1.6%.   \par   \par MELANOMA RISK:  \par Lifetime risk to develop melanoma is estimated to be up to 5% compared to the general population risk of 2.3%.   \par  \par In addition, a variant of uncertain significance (VUS) was detected in the CHEK2 gene.  At this time the available evidence is insufficient to determine the role of this VUS in disease and the clinical significance of this result is uncertain. Individuals with a pathogenic mutation in CHEK2 may have an increased risk for breast and colon cancer. It is unknown if the patient has an increased risk for the cancers associated with CHEK2 at this time. Of note, two labs ([facility] & [facility]) classify this CHEK2 variant as Benign/Likely Benign. \par  \par The detection of this VUS does NOT currently change the patient’s medical management. It is NOT recommended at this time that family members use this VUS result for predictive genetic testing or medical management decisions. With more research, a VUS may be reclassified as either disease-causing or benign. The patient was encouraged to contact us every 2-3 years to inquire about any new information for this variant.   \par  \par IMPLICATIONS FOR THE PATIENT: \par Given Ms. Cameron’s personal and current reported family history of cancer, and her BRCA2 positive genetic test results, the following screening guidelines and risk-reducing recommendations were discussed: \par  \par BREAST:  \par - We recommended undergoing high-risk breast screening via annual breast MRIs with contrast starting at age 25-29 and annual mammograms starting at age 30. We emphasized the importance of breast awareness. We recommended that a baseline mammogram and breast MRI be completed prior to any risk reducing surgeries. \par - We discussed the option of risk reducing bilateral mastectomy and chemoprevention, including the pros/cons and age to consider these options. We encouraged Ms. Cameron to discuss these options in greater detail with a breast surgeon, referrals were emailed. Of note, Ms. Cameron stated she is leaning towards a bilateral mastectomy.   \par  \par OVARIAN: \par - We discussed ovarian cancer screening with transvaginal ultrasound and/or serum  testing has not been shown to reduce ovarian cancer mortality in women with BRCA2 mutations. Given this, we strongly recommend consideration of risk-reducing salpingo-oophorectomy (RRSO) for ovarian cancer risk-reduction between the ages of 40-45 (or when childbearing is complete). Of note, Ms. Cameron only had a IFRAH and bilateral salpingectomy for treatment when she was diagnosed with endometrial cancer, the ovaries are intact and therefore risk reducing oophorectomy should be considered. Ms. Cameron was already planning to pursue an oophorectomy with Dr. Roth if she tested positive. We encouraged her to follow-up with Dr. Roth and have this procedure done this year. \par  \par MELANOMA: \par - No specific screening guidelines exist, however, general melanoma risk management is appropriate, such as a full body skin examination by a physician and minimizing UV exposure. Ms. Cameron was encouraged to see a dermatologist annually, referrals were emailed. \par  \par PANCREATIC: \par - We discussed investigational pancreatic cancer screening may be considered for individuals with a pathogenic BRCA2 mutation and a family history of pancreatic cancer (first or second degree relative) beginning at age 50 (or 10 years younger than earliest diagnosis) in the setting of an experienced high-volume center, ideally under research conditions. \par - Given Ms. Cameron’s age and current reported family history, screening is not indicated at this time.  \par \par UTERINE:\par -  Long-term management and surveillance should be based on Ms. Cameron’s on- or post-treatment protocol as recommended by her oncologist. \par \par OTHER: \par - In the absence of other indications, Ms. Cameron should practice age-appropriate cancer screening of other organ systems as recommended for the general population. \par  \par IMPLICATIONS FOR FAMILY MEMBERS: \par This mutation is inherited in an autosomal dominant pattern. We recommend the patient’s first-degree relatives, specifically her mother, pursue genetic counseling and genetic testing as there is a 50% chance they also have the same mutation. Ms. Cameron noted that her mother is in [place] but is likely not going to pursue genetic testing. We therefore encouraged her to share her genetic testing results with her cousins on both her maternal and paternal side of the family as we do not know which side of the family she inherited the gene mutation from. Ms. Cameron was made aware that if any at-risk relatives wanted to pursue genetic testing any time in the future, we would be happy to see them and coordinate testing. If they are not local, they can locate a genetic counselor using the National Society of Genetic Counselors, Find a Genetic Counselor Tool (www.nsgc.org/findageneticcounselor). \par   \par RESOURCES & SUPPORT GROUPS \par Facing Our Risk of cancer Empowered (FORCE): www.FacingOurRisk.org   \par Bright Calpella: www.BrightPink.org  \par Sharsheret: www.[email or web address]  \par   \par In addition, the oncology social workers at [facility] are available to assist with more referrals, if necessary. \par  \par PLAN: \par 1. See above note for recommended management. \par 2. We encouraged sharing these results with family members as noted above. They have a risk to have inherited the same BRCA2 mutation. Other family may benefit from genetic testing and should contact a certified genetic counselor specializing in cancer. Due to HIPAA and [place] laws, Genetics is unable to directly contact other family at risk, but we are available should family members wish to reach out to us. \par 3. The CHEK2 VUS does not change the patient’s medical management. We do not recommend testing family members for the CHEK2 VUS. Ms. Cameron was encouraged to contact us every 2-3 years to check on any changes in the interpretation of the VUS. \par 4. Family support resources and referrals were provided.  \par 5. Patient informed consult note(s) will be available through their [facility] patient portal. \par 6. Genetic knowledge changes rapidly. We encouraged re-contacting [facility] every 2-3 years and returning for a visit closer to age 50 for any changes in screening recommendations or sooner if there are significant changes in personal or family history \par  \par For any additional questions please call [facility] at [phone].  \par  \par \par Sean Isbell MS, [facility]\par Genetic Counselor, [facility]\par \par \par Attending Attestation:\par \par I have reviewed and edited the genetic counselor's note and I agree with the assessment and plan as documented. I spent approximately 45 minutes in total time of which approximately 20 minutes was face-to-face (via 2-way audiovisual telemedicine connection) with Ms. Cameron reviewing her relevant personal and family history, the genetic testing results, our risk-assessment, and options for future cancer risk-reduction for the patient and her relevant family members. Over half this time was spent in counseling and coordination of care.\par \par Higinio Boland MD\par Chief, Cancer Genetics\par [facility]\par \par \par CC: \par Patient\par Dr. Adonis Roth\par

## 2023-03-10 ENCOUNTER — APPOINTMENT (OUTPATIENT)
Dept: GYNECOLOGIC ONCOLOGY | Facility: CLINIC | Age: 43
End: 2023-03-10
Payer: MEDICAID

## 2023-03-10 VITALS
HEART RATE: 98 BPM | HEIGHT: 68 IN | BODY MASS INDEX: 43.95 KG/M2 | DIASTOLIC BLOOD PRESSURE: 84 MMHG | SYSTOLIC BLOOD PRESSURE: 135 MMHG | WEIGHT: 290 LBS

## 2023-03-10 DIAGNOSIS — K80.20 CALCULUS OF GALLBLADDER W/OUT CHOLECYSTITIS W/OUT OBSTRUCTION: ICD-10-CM

## 2023-03-10 PROCEDURE — 99213 OFFICE O/P EST LOW 20 MIN: CPT

## 2023-03-13 ENCOUNTER — RESULT REVIEW (OUTPATIENT)
Age: 43
End: 2023-03-13

## 2023-03-17 LAB — CANCER AG125 SERPL-ACNC: 11 U/ML

## 2023-03-26 PROBLEM — K80.20 CHOLELITHIASIS: Status: ACTIVE | Noted: 2018-10-29

## 2023-04-25 ENCOUNTER — OUTPATIENT (OUTPATIENT)
Dept: OUTPATIENT SERVICES | Facility: HOSPITAL | Age: 43
LOS: 1 days | End: 2023-04-25
Payer: MEDICAID

## 2023-04-25 ENCOUNTER — APPOINTMENT (OUTPATIENT)
Dept: CT IMAGING | Facility: CLINIC | Age: 43
End: 2023-04-25
Payer: MEDICAID

## 2023-04-25 DIAGNOSIS — C54.1 MALIGNANT NEOPLASM OF ENDOMETRIUM: ICD-10-CM

## 2023-04-25 DIAGNOSIS — Z15.01 GENETIC SUSCEPTIBILITY TO MALIGNANT NEOPLASM OF BREAST: ICD-10-CM

## 2023-04-25 DIAGNOSIS — E27.8 OTHER SPECIFIED DISORDERS OF ADRENAL GLAND: ICD-10-CM

## 2023-04-25 DIAGNOSIS — Z98.890 OTHER SPECIFIED POSTPROCEDURAL STATES: Chronic | ICD-10-CM

## 2023-04-25 DIAGNOSIS — K80.20 CALCULUS OF GALLBLADDER WITHOUT CHOLECYSTITIS WITHOUT OBSTRUCTION: ICD-10-CM

## 2023-04-25 PROCEDURE — 74177 CT ABD & PELVIS W/CONTRAST: CPT

## 2023-04-25 PROCEDURE — 74177 CT ABD & PELVIS W/CONTRAST: CPT | Mod: 26

## 2023-05-16 ENCOUNTER — APPOINTMENT (OUTPATIENT)
Dept: GYNECOLOGIC ONCOLOGY | Facility: CLINIC | Age: 43
End: 2023-05-16
Payer: MEDICAID

## 2023-05-16 DIAGNOSIS — E27.8 OTHER SPECIFIED DISORDERS OF ADRENAL GLAND: ICD-10-CM

## 2023-05-16 DIAGNOSIS — R39.89 OTHER SYMPTOMS AND SIGNS INVOLVING THE GENITOURINARY SYSTEM: ICD-10-CM

## 2023-05-16 DIAGNOSIS — R19.00 INTRA-ABDOMINAL AND PELVIC SWELLING, MASS AND LUMP, UNSPECIFIED SITE: ICD-10-CM

## 2023-05-16 DIAGNOSIS — Z15.09 GENETIC SUSCEPTIBILITY TO MALIGNANT NEOPLASM OF BREAST: ICD-10-CM

## 2023-05-16 DIAGNOSIS — Z15.01 GENETIC SUSCEPTIBILITY TO MALIGNANT NEOPLASM OF BREAST: ICD-10-CM

## 2023-05-16 DIAGNOSIS — C54.1 MALIGNANT NEOPLASM OF ENDOMETRIUM: ICD-10-CM

## 2023-05-16 PROCEDURE — 99214 OFFICE O/P EST MOD 30 MIN: CPT | Mod: 95

## 2023-05-19 PROBLEM — R39.89 BLADDER PAIN: Status: ACTIVE | Noted: 2023-05-19

## 2023-05-19 PROBLEM — R19.00 PELVIC MASS: Status: ACTIVE | Noted: 2023-05-19

## 2025-04-29 NOTE — DISCHARGE NOTE ADULT - CARE PROVIDERS DIRECT ADDRESSES
Quality 431: Preventive Care And Screening: Unhealthy Alcohol Use - Screening: Patient not identified as an unhealthy alcohol user when screened for unhealthy alcohol use using a systematic screening method
Detail Level: Detailed
Quality 226: Preventive Care And Screening: Tobacco Use: Screening And Cessation Intervention: Patient screened for tobacco use and is an ex/non-smoker
[email or web address]